# Patient Record
Sex: MALE | Race: BLACK OR AFRICAN AMERICAN | Employment: FULL TIME | URBAN - METROPOLITAN AREA
[De-identification: names, ages, dates, MRNs, and addresses within clinical notes are randomized per-mention and may not be internally consistent; named-entity substitution may affect disease eponyms.]

---

## 2017-06-30 ENCOUNTER — HOSPITAL ENCOUNTER (OUTPATIENT)
Dept: OTHER | Age: 47
Discharge: HOME | End: 2017-06-30
Attending: PHYSICIAN ASSISTANT | Admitting: PHYSICIAN ASSISTANT

## 2017-06-30 DIAGNOSIS — S46.911A STRAIN OF RIGHT SHOULDER, INITIAL ENCOUNTER: ICD-10-CM

## 2017-07-10 ENCOUNTER — HOSPITAL ENCOUNTER (OUTPATIENT)
Dept: MRI IMAGING | Age: 47
Discharge: HOME | End: 2017-07-10
Attending: PHYSICIAN ASSISTANT | Admitting: PHYSICIAN ASSISTANT

## 2017-07-10 DIAGNOSIS — S46.911A STRAIN OF RIGHT SHOULDER, INITIAL ENCOUNTER: ICD-10-CM

## 2017-08-02 ENCOUNTER — HOSPITAL ENCOUNTER (OUTPATIENT)
Dept: OTHER | Age: 47
Discharge: HOME | End: 2017-08-31
Attending: PHYSICIAN ASSISTANT | Admitting: PHYSICIAN ASSISTANT

## 2017-08-04 ENCOUNTER — HOSPITAL ENCOUNTER (OUTPATIENT)
Dept: PHYSICAL THERAPY | Age: 47
Discharge: HOME | End: 2017-08-05
Admitting: PHYSICIAN ASSISTANT

## 2017-08-09 ENCOUNTER — HOSPITAL ENCOUNTER (OUTPATIENT)
Dept: PHYSICAL THERAPY | Age: 47
Discharge: HOME | End: 2017-08-10
Admitting: PHYSICIAN ASSISTANT

## 2017-08-11 ENCOUNTER — HOSPITAL ENCOUNTER (OUTPATIENT)
Dept: PHYSICAL THERAPY | Age: 47
Discharge: HOME | End: 2017-08-12
Admitting: PHYSICIAN ASSISTANT

## 2017-08-14 ENCOUNTER — HOSPITAL ENCOUNTER (OUTPATIENT)
Dept: PHYSICAL THERAPY | Age: 47
Discharge: HOME | End: 2017-08-15
Admitting: PHYSICIAN ASSISTANT

## 2017-08-15 ENCOUNTER — OFFICE VISIT (OUTPATIENT)
Dept: ORTHOPEDIC SURGERY | Age: 47
End: 2017-08-15

## 2017-08-15 VITALS
WEIGHT: 190 LBS | SYSTOLIC BLOOD PRESSURE: 113 MMHG | HEIGHT: 70 IN | HEART RATE: 60 BPM | BODY MASS INDEX: 27.2 KG/M2 | RESPIRATION RATE: 16 BRPM | DIASTOLIC BLOOD PRESSURE: 51 MMHG

## 2017-08-15 DIAGNOSIS — M67.911 TENDINOPATHY OF ROTATOR CUFF, RIGHT: Primary | ICD-10-CM

## 2017-08-15 PROBLEM — M67.919 TENDINOPATHY OF ROTATOR CUFF: Status: ACTIVE | Noted: 2017-08-15

## 2017-08-15 PROCEDURE — 99243 OFF/OP CNSLTJ NEW/EST LOW 30: CPT | Performed by: ORTHOPAEDIC SURGERY

## 2017-08-15 RX ORDER — TRAMADOL HYDROCHLORIDE 50 MG/1
50 TABLET ORAL 2 TIMES DAILY PRN
Qty: 60 TABLET | Refills: 0 | Status: SHIPPED | OUTPATIENT
Start: 2017-08-15 | End: 2017-09-14

## 2017-08-16 ENCOUNTER — HOSPITAL ENCOUNTER (OUTPATIENT)
Dept: PHYSICAL THERAPY | Age: 47
Discharge: HOME | End: 2017-08-17
Admitting: PHYSICIAN ASSISTANT

## 2017-08-18 ENCOUNTER — HOSPITAL ENCOUNTER (OUTPATIENT)
Dept: PHYSICAL THERAPY | Age: 47
Discharge: HOME | End: 2017-08-19
Admitting: PHYSICIAN ASSISTANT

## 2017-08-21 ENCOUNTER — HOSPITAL ENCOUNTER (OUTPATIENT)
Dept: PHYSICAL THERAPY | Age: 47
Discharge: HOME | End: 2017-08-22
Admitting: PHYSICIAN ASSISTANT

## 2017-08-25 ENCOUNTER — TELEPHONE (OUTPATIENT)
Dept: ORTHOPEDIC SURGERY | Age: 47
End: 2017-08-25

## 2017-08-29 ENCOUNTER — TELEPHONE (OUTPATIENT)
Dept: ORTHOPEDIC SURGERY | Age: 47
End: 2017-08-29

## 2017-10-10 ENCOUNTER — TELEPHONE (OUTPATIENT)
Dept: ORTHOPEDIC SURGERY | Age: 47
End: 2017-10-10

## 2017-10-23 ENCOUNTER — TELEPHONE (OUTPATIENT)
Dept: ORTHOPEDIC SURGERY | Age: 47
End: 2017-10-23

## 2017-10-31 ENCOUNTER — OFFICE VISIT (OUTPATIENT)
Dept: ORTHOPEDIC SURGERY | Age: 47
End: 2017-10-31

## 2017-10-31 VITALS
HEART RATE: 68 BPM | BODY MASS INDEX: 26.92 KG/M2 | SYSTOLIC BLOOD PRESSURE: 121 MMHG | HEIGHT: 70 IN | WEIGHT: 188 LBS | DIASTOLIC BLOOD PRESSURE: 76 MMHG

## 2017-10-31 DIAGNOSIS — M67.911 TENDINOPATHY OF RIGHT ROTATOR CUFF: Primary | ICD-10-CM

## 2017-10-31 PROCEDURE — 20610 DRAIN/INJ JOINT/BURSA W/O US: CPT | Performed by: ORTHOPAEDIC SURGERY

## 2017-10-31 NOTE — PROGRESS NOTES
CHIEF COMPLAINT: Right shoulder pain    DATE OF INJURY: 5/6/17    History:    Monse Valle is a 48 y.o. right handed Black male here for right shoulder follow up. Initial history:  referred by Helen Garcia PA-C and Amarilys for evaluation and treatment of Right shoulder pain. This is evaluated as a workers compensation injury. The pain is described as aching, sharp and stabbing. The pain began 3 months ago. There was an injury. He was lifting a 50lb flavor bucket and felt a pop in his shoulder. Pain is rated as a 10/10 . Pain is located lateral.  The symptoms are worse with reaching, lifting, work at or above shoulder height, difficulty sleeping on affected side. Symptoms improve with ice and prednisone. Limited activities include work at or above shoulder height. No stiffness, no weakness reported. The patient does report night pain. The patient has had PT. He has been to a few sessions of PT at [facility] where they have done TENS and ultrasound . The patient has not had an injection. The patient has tried NSAIDs. 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 Interval History:  Shoulder feels the same. Rates pain 9/10. Additional PT was not approved. He has only been to 6 sessions of PT. He has been trying to do HEP. Injection was just approved, though he was seen about 2 months ago. Physical Examination:      Vital signs:  /76   Pulse 68   Ht 5' 10\" (1.778 m)   Wt 188 lb (85.3 kg)   BMI 26.98 kg/m²      General:   alert, appears stated age, cooperative and no distress   Right Shoulder   Active ROM:   forward flexion 90, external rotation 45, internal rotation L4. Left shoulder: forward flexion 180, external rotation 80, internal rotation L4.       Passive ROM:  forward flexion 180    Joint Tenderness:   lateral acromial   Neer:   positive   Cohn:   positive   Strength:   5/5 Supraspinatus, External rotation, Internal rotation    Bilateral

## 2017-11-09 ENCOUNTER — TELEPHONE (OUTPATIENT)
Dept: ORTHOPEDIC SURGERY | Age: 47
End: 2017-11-09

## 2018-04-03 ENCOUNTER — OFFICE VISIT (OUTPATIENT)
Dept: ORTHOPEDIC SURGERY | Age: 48
End: 2018-04-03

## 2018-04-03 VITALS
HEIGHT: 70 IN | BODY MASS INDEX: 26.48 KG/M2 | DIASTOLIC BLOOD PRESSURE: 74 MMHG | HEART RATE: 74 BPM | RESPIRATION RATE: 16 BRPM | WEIGHT: 185 LBS | SYSTOLIC BLOOD PRESSURE: 119 MMHG

## 2018-04-03 DIAGNOSIS — M67.911 TENDINOPATHY OF RIGHT ROTATOR CUFF: Primary | ICD-10-CM

## 2018-04-03 DIAGNOSIS — M19.019 AC (ACROMIOCLAVICULAR) ARTHRITIS: ICD-10-CM

## 2018-04-03 PROCEDURE — 99214 OFFICE O/P EST MOD 30 MIN: CPT | Performed by: ORTHOPAEDIC SURGERY

## 2018-04-03 RX ORDER — CYCLOBENZAPRINE HCL 5 MG
10 TABLET ORAL 3 TIMES DAILY PRN
COMMUNITY
End: 2018-11-15

## 2018-04-03 RX ORDER — TRAMADOL HYDROCHLORIDE 50 MG/1
50 TABLET ORAL EVERY 6 HOURS PRN
COMMUNITY
End: 2018-06-01 | Stop reason: HOSPADM

## 2018-04-11 ENCOUNTER — TELEPHONE (OUTPATIENT)
Dept: ORTHOPEDIC SURGERY | Age: 48
End: 2018-04-11

## 2018-04-16 ENCOUNTER — TELEPHONE (OUTPATIENT)
Dept: ORTHOPEDIC SURGERY | Age: 48
End: 2018-04-16

## 2018-05-29 ENCOUNTER — PAT TELEPHONE (OUTPATIENT)
Dept: PREADMISSION TESTING | Age: 48
End: 2018-05-29

## 2018-05-29 VITALS — WEIGHT: 185 LBS | HEIGHT: 70 IN | BODY MASS INDEX: 26.48 KG/M2

## 2018-05-29 RX ORDER — SILDENAFIL CITRATE 20 MG/1
20 TABLET ORAL 3 TIMES DAILY
COMMUNITY
End: 2018-11-15

## 2018-06-01 ENCOUNTER — HOSPITAL ENCOUNTER (OUTPATIENT)
Dept: SURGERY | Age: 48
Discharge: HOME | End: 2018-06-01
Attending: ORTHOPAEDIC SURGERY | Admitting: ORTHOPAEDIC SURGERY

## 2018-06-01 VITALS
RESPIRATION RATE: 16 BRPM | WEIGHT: 190 LBS | OXYGEN SATURATION: 98 % | BODY MASS INDEX: 27.26 KG/M2 | DIASTOLIC BLOOD PRESSURE: 77 MMHG | TEMPERATURE: 97.8 F | SYSTOLIC BLOOD PRESSURE: 118 MMHG | HEART RATE: 57 BPM

## 2018-06-01 DIAGNOSIS — M67.911 TENDINOPATHY OF RIGHT ROTATOR CUFF: Primary | ICD-10-CM

## 2018-06-01 LAB
GLUCOSE BLD-MCNC: 118 MG/DL (ref 70–99)
GLUCOSE BLD-MCNC: 133 MG/DL (ref 70–99)
PERFORMED ON: ABNORMAL
PERFORMED ON: ABNORMAL

## 2018-06-01 PROCEDURE — 29828 SHO ARTHRS SRG BICP TENODSIS: CPT | Performed by: ORTHOPAEDIC SURGERY

## 2018-06-01 PROCEDURE — 29826 SHO ARTHRS SRG DECOMPRESSION: CPT | Performed by: ORTHOPAEDIC SURGERY

## 2018-06-01 RX ORDER — OXYCODONE HYDROCHLORIDE AND ACETAMINOPHEN 5; 325 MG/1; MG/1
1 TABLET ORAL EVERY 4 HOURS PRN
Qty: 60 TABLET | Refills: 0 | Status: SHIPPED | OUTPATIENT
Start: 2018-06-01 | End: 2018-06-14 | Stop reason: SDUPTHER

## 2018-06-01 RX ORDER — SODIUM CHLORIDE 0.9 % (FLUSH) 0.9 %
10 SYRINGE (ML) INJECTION PRN
Status: DISCONTINUED | OUTPATIENT
Start: 2018-06-01 | End: 2018-06-02 | Stop reason: HOSPADM

## 2018-06-01 RX ORDER — ONDANSETRON 2 MG/ML
4 INJECTION INTRAMUSCULAR; INTRAVENOUS
Status: ACTIVE | OUTPATIENT
Start: 2018-06-01 | End: 2018-06-01

## 2018-06-01 RX ORDER — FENTANYL CITRATE 50 UG/ML
25 INJECTION, SOLUTION INTRAMUSCULAR; INTRAVENOUS EVERY 5 MIN PRN
Status: DISCONTINUED | OUTPATIENT
Start: 2018-06-01 | End: 2018-06-02 | Stop reason: HOSPADM

## 2018-06-01 RX ORDER — OXYCODONE HYDROCHLORIDE AND ACETAMINOPHEN 5; 325 MG/1; MG/1
2 TABLET ORAL PRN
Status: ACTIVE | OUTPATIENT
Start: 2018-06-01 | End: 2018-06-01

## 2018-06-01 RX ORDER — PROMETHAZINE HYDROCHLORIDE 25 MG/1
25 TABLET ORAL EVERY 6 HOURS PRN
Qty: 5 TABLET | Refills: 0 | Status: SHIPPED | OUTPATIENT
Start: 2018-06-01 | End: 2018-06-25 | Stop reason: ALTCHOICE

## 2018-06-01 RX ORDER — SODIUM CHLORIDE 0.9 % (FLUSH) 0.9 %
10 SYRINGE (ML) INJECTION EVERY 12 HOURS SCHEDULED
Status: DISCONTINUED | OUTPATIENT
Start: 2018-06-01 | End: 2018-06-02 | Stop reason: HOSPADM

## 2018-06-01 RX ORDER — OXYCODONE HYDROCHLORIDE AND ACETAMINOPHEN 5; 325 MG/1; MG/1
1 TABLET ORAL PRN
Status: ACTIVE | OUTPATIENT
Start: 2018-06-01 | End: 2018-06-01

## 2018-06-01 ASSESSMENT — PAIN DESCRIPTION - DESCRIPTORS: DESCRIPTORS: THROBBING;STABBING;SHARP

## 2018-06-01 ASSESSMENT — PAIN SCALES - GENERAL
PAINLEVEL_OUTOF10: 0
PAINLEVEL_OUTOF10: 0

## 2018-06-01 ASSESSMENT — PAIN - FUNCTIONAL ASSESSMENT: PAIN_FUNCTIONAL_ASSESSMENT: 0-10

## 2018-06-01 ASSESSMENT — LIFESTYLE VARIABLES: SMOKING_STATUS: 0

## 2018-06-01 ASSESSMENT — ENCOUNTER SYMPTOMS: SHORTNESS OF BREATH: 0

## 2018-06-04 ENCOUNTER — OFFICE VISIT (OUTPATIENT)
Dept: ORTHOPEDIC SURGERY | Age: 48
End: 2018-06-04

## 2018-06-04 VITALS — WEIGHT: 190.04 LBS | RESPIRATION RATE: 17 BRPM | BODY MASS INDEX: 27.21 KG/M2 | HEIGHT: 70 IN | HEART RATE: 72 BPM

## 2018-06-04 DIAGNOSIS — S43.431D SUPERIOR GLENOID LABRUM LESION OF RIGHT SHOULDER, SUBSEQUENT ENCOUNTER: ICD-10-CM

## 2018-06-04 DIAGNOSIS — M67.911 TENDINOPATHY OF RIGHT ROTATOR CUFF: Primary | ICD-10-CM

## 2018-06-04 PROCEDURE — 99024 POSTOP FOLLOW-UP VISIT: CPT | Performed by: ORTHOPAEDIC SURGERY

## 2018-06-08 ENCOUNTER — TELEPHONE (OUTPATIENT)
Dept: ORTHOPEDIC SURGERY | Age: 48
End: 2018-06-08

## 2018-06-14 ENCOUNTER — TELEPHONE (OUTPATIENT)
Dept: ORTHOPEDIC SURGERY | Age: 48
End: 2018-06-14

## 2018-06-14 DIAGNOSIS — S43.431D SUPERIOR GLENOID LABRUM LESION OF RIGHT SHOULDER, SUBSEQUENT ENCOUNTER: ICD-10-CM

## 2018-06-14 DIAGNOSIS — M67.911 TENDINOPATHY OF RIGHT ROTATOR CUFF: Primary | ICD-10-CM

## 2018-06-14 RX ORDER — CYCLOBENZAPRINE HCL 5 MG
5 TABLET ORAL 3 TIMES DAILY PRN
Qty: 30 TABLET | Refills: 0 | Status: SHIPPED | OUTPATIENT
Start: 2018-06-14 | End: 2018-06-24

## 2018-06-14 RX ORDER — OXYCODONE HYDROCHLORIDE AND ACETAMINOPHEN 5; 325 MG/1; MG/1
1 TABLET ORAL EVERY 8 HOURS PRN
Qty: 21 TABLET | Refills: 0 | Status: SHIPPED | OUTPATIENT
Start: 2018-06-14 | End: 2018-06-21

## 2018-06-19 ENCOUNTER — OFFICE VISIT (OUTPATIENT)
Dept: ORTHOPEDIC SURGERY | Age: 48
End: 2018-06-19

## 2018-06-19 VITALS — BODY MASS INDEX: 27.2 KG/M2 | RESPIRATION RATE: 16 BRPM | WEIGHT: 190 LBS | HEIGHT: 70 IN

## 2018-06-19 DIAGNOSIS — S43.431D SUPERIOR GLENOID LABRUM LESION OF RIGHT SHOULDER, SUBSEQUENT ENCOUNTER: ICD-10-CM

## 2018-06-19 DIAGNOSIS — M67.911 TENDINOPATHY OF RIGHT ROTATOR CUFF: Primary | ICD-10-CM

## 2018-06-19 PROBLEM — M19.019 AC (ACROMIOCLAVICULAR) ARTHRITIS: Status: RESOLVED | Noted: 2018-04-03 | Resolved: 2018-06-19

## 2018-06-19 PROCEDURE — 99024 POSTOP FOLLOW-UP VISIT: CPT | Performed by: ORTHOPAEDIC SURGERY

## 2018-06-25 ENCOUNTER — TELEPHONE (OUTPATIENT)
Dept: ORTHOPEDIC SURGERY | Age: 48
End: 2018-06-25

## 2018-06-25 DIAGNOSIS — S43.431D SUPERIOR GLENOID LABRUM LESION OF RIGHT SHOULDER, SUBSEQUENT ENCOUNTER: Primary | ICD-10-CM

## 2018-06-25 RX ORDER — OXYCODONE HYDROCHLORIDE AND ACETAMINOPHEN 5; 325 MG/1; MG/1
1 TABLET ORAL EVERY 8 HOURS PRN
Qty: 21 TABLET | Refills: 0 | Status: SHIPPED | OUTPATIENT
Start: 2018-06-25 | End: 2018-07-02

## 2018-06-26 ENCOUNTER — HOSPITAL ENCOUNTER (OUTPATIENT)
Dept: OTHER | Age: 48
Discharge: HOME | End: 2018-06-30
Attending: ORTHOPAEDIC SURGERY | Admitting: ORTHOPAEDIC SURGERY

## 2018-06-26 NOTE — FLOWSHEET NOTE
Physical Therapy Daily Treatment Note  Date:  2018    Patient Name:  Rose Mendenhall    :  1970  MRN: [id number]    Restrictions/Precautions: Position Activity Restriction  Other position/activity restrictions: no fall risk     Pertinent Medical History: Additional Pertinent Hx: DM, HTN    Medical/Treatment Diagnosis Information:  · Diagnosis: R shoulder sx with scope for SLAP tear and RTC tear; biceps tenodesis   · Treatment Diagnosis: R shoulder pain, dec ROM, dec strength     Insurance/Certification information:  PT Insurance Information: WC x 18 visits   Physician Information:  Referring Practitioner: Dr. Rodriguez Meth of care signed (Y/N):  Sent to inbox    Visit# / total visits:   - G code at 10   Pain level: 9/10     G-Code (if applicable):      Date / Visit # G-Code Applied:  /  PT G-Codes  Functional Assessment Tool Used: UEFS  Score: 3.75/80-99%  Functional Limitation: Carrying, moving and handling objects  Carrying, Moving and Handling Objects Current Status (): At least 80 percent but less than 100 percent impaired, limited or restricted  Carrying, Moving and Handling Objects Goal Status (): At least 60 percent but less than 80 percent impaired, limited or restricted    Progress Note: []  Yes  [x]  No  Next due by: Visit #10      History of Injury: Subjective  Pt with R shoulder sx on 18 for SLAP tear and RTC tear debridement  with SAD and biceps tenodesis. Pt wearing sling daily, icing daily, hand exercises. Next f/u . Pain up to 9/10 in R shoulder with intermittent hand NT . Subjective:   Pt had injury and PT during summer of 2017 and then [facility] complications led him to be without therapy from 2017- until surgery and now. Pt reports MD was very upset with WC and told him due to tightness/frozen shoulder he will be in PT for a long time.      Objective:  Observation:   Test measurements:      Exercises:per protocol; no resisted elbow flex/supination x 6 wks

## 2018-06-26 NOTE — PROGRESS NOTES
Physical Therapy  Initial Assessment  Date: 2018  Patient Name: Garrison Mcneill  MRN: [id number]  : 1970     Treatment Diagnosis: R shoulder pain, dec ROM, dec strength     Restrictions  Position Activity Restriction  Other position/activity restrictions: no fall risk     Subjective   General  Chart Reviewed: Yes  Patient assessed for rehabilitation services?: Yes  Additional Pertinent Hx: DM, HTN  Family / Caregiver Present: No  Referring Practitioner: Dr. Savi Sue  Referral Date : 18  Diagnosis: R shoulder sx with scope for SLAP tear and RTC tear; biceps tenodesis   General Comment  Comments: Pt with R shoulder sx on 18 for SLAP tear and RTC tear, with SAD and biceps tenodesis. Pt wearing sling daily, icing daily, hand exercises. Next f/u . Pain up to 9/10 in R shoulder with intermittent hand NT . PT Visit Information  PT Insurance Information: WC x 18 visits   Total # of Visits Approved: 18  Total # of Visits to Date: 1  Progress Note Counter: 1/10  Subjective  Subjective: 18          Social/Functional History  Social/Functional History  Type of occupation: need to lift 40-50# consistent with work   IADL Comments: needs A with all ADLs now   Objective     Observation/Palpation  Palpation: very TTP all over R shoulder   Observation: incision portals healed up well     PROM RUE (degrees)  R Shoulder Flex  0-180: 45  R Shoulder ABduction 0-180: 30  R Shoulder Int Rotation  0-70: to belly - arm by side   R Shoulder Ext Rotation  0-90: 20 arm by side    Strength RUE  Comment: NT yet due to recent sx, in sling, painful      Assessment   Conditions Requiring Skilled Therapeutic Intervention  Body structures, Functions, Activity limitations: Decreased functional mobility ; Decreased ADL status; Decreased ROM; Decreased strength;Decreased endurance;Decreased high-level IADLs  Assessment: prior level of function: pt able to perform all self care, reaching, lifting independently without c/o; DX:

## 2018-06-26 NOTE — PLAN OF CARE
Outpatient Physical Therapy  [x] [facility]    Phone: [phone]   Fax: [phone]   [] [facility]  Phone: [phone]              Fax: [phone]  [] [facility]   Phone: [phone]   Fax: [phone]     To: Referring Practitioner: Dr. Meryl Da Silva      Patient: Adilia Dale   : 1970   MRN: [id number]  Evaluation Date: 2018      Diagnosis Information:  · Diagnosis: R shoulder sx with scope for SLAP tear and RTC tear; biceps tenodesis    · Treatment Diagnosis: R shoulder pain, dec ROM, dec strength      Physical Therapy Certification/Re-Certification Form  Dear Dr. Meryl Da Silva,   The following patient has been evaluated for physical therapy services and for therapy to continue, Medicare requires monthly physician review of the treatment plan. Please review the attached evaluation and/or summary of the patient's plan of care, and verify that you agree therapy should continue by signing the attached document and sending it back to our office. Plan of Care/Treatment to date:per protocol   [x] Therapeutic Exercise    [x] Modalities:  [x] Therapeutic Activity     [x] Ultrasound  [x] Electrical Stimulation  [] Gait Training      [] Cervical Traction [] Lumbar Traction  [x] Neuromuscular Re-education    [x] Cold/hotpack [] Iontophoresis   [x] Instruction in HEP     Other:  [x] Manual Therapy      []             [] Aquatic Therapy      []           ? Frequency/Duration:  # Days per week: [] 1 day # Weeks: [] 1 week [] 5 weeks     [x] 2 days? [] 2 weeks [x] 6 weeks     [x] 3 days   [] 3 weeks [] 7 weeks     [] 4 days   [] 4 weeks [x] 8 weeks    Rehab Potential: [] Excellent [x] Good [] Fair  [] Poor       Electronically signed by:  Criselda Agrawal [address]    If you have any questions or concerns, please don't hesitate to call.   Thank you for your referral.      Physician Signature:________________________________Date:__________________  By signing above, therapists plan is approved by physician

## 2018-06-28 ENCOUNTER — HOSPITAL ENCOUNTER (OUTPATIENT)
Dept: PHYSICAL THERAPY | Age: 48
Discharge: HOME | End: 2018-06-29
Admitting: ORTHOPAEDIC SURGERY

## 2018-07-01 ENCOUNTER — HOSPITAL ENCOUNTER (OUTPATIENT)
Dept: OTHER | Age: 48
Discharge: HOME | End: 2018-07-19
Attending: ORTHOPAEDIC SURGERY | Admitting: ORTHOPAEDIC SURGERY

## 2018-07-05 ENCOUNTER — TELEPHONE (OUTPATIENT)
Dept: ORTHOPEDIC SURGERY | Age: 48
End: 2018-07-05

## 2018-07-05 NOTE — TELEPHONE ENCOUNTER
After consulting with Dr Yola Roblero, patient was added on to see Dr Yola Roblero Monday 7/9/18. Explained that Dr Yola Roblero is out of town until then. Dr Yola Roblero stated it was fine to follow up Monday.

## 2018-07-06 ENCOUNTER — TELEPHONE (OUTPATIENT)
Dept: ORTHOPEDIC SURGERY | Age: 48
End: 2018-07-06

## 2018-07-06 NOTE — TELEPHONE ENCOUNTER
Called patient. Explained that Dr Yola Roblero is out of town today. Informed him that this could possibly be Monday when he comes in to see him in office. He said he has more pain so he took more meds and now doesn't have any. Asked if he could take Ibuprofen. I told him that he could not take NSAIDS, but he may take Tylenol,rest and ice. Dr Yola Roblero, patient is requesting Percocet refill. Last fill 6/25/18.   Thank you

## 2018-07-09 ENCOUNTER — TELEPHONE (OUTPATIENT)
Dept: ORTHOPEDIC SURGERY | Age: 48
End: 2018-07-09

## 2018-07-09 ENCOUNTER — OFFICE VISIT (OUTPATIENT)
Dept: ORTHOPEDIC SURGERY | Age: 48
End: 2018-07-09

## 2018-07-09 VITALS — WEIGHT: 190 LBS | BODY MASS INDEX: 27.2 KG/M2 | RESPIRATION RATE: 16 BRPM | HEIGHT: 70 IN

## 2018-07-09 DIAGNOSIS — S43.431D SUPERIOR GLENOID LABRUM LESION OF RIGHT SHOULDER, SUBSEQUENT ENCOUNTER: ICD-10-CM

## 2018-07-09 DIAGNOSIS — M67.911 TENDINOPATHY OF RIGHT ROTATOR CUFF: Primary | ICD-10-CM

## 2018-07-09 PROCEDURE — 99024 POSTOP FOLLOW-UP VISIT: CPT | Performed by: ORTHOPAEDIC SURGERY

## 2018-07-09 RX ORDER — OXYCODONE HYDROCHLORIDE AND ACETAMINOPHEN 5; 325 MG/1; MG/1
1 TABLET ORAL EVERY 8 HOURS PRN
Qty: 21 TABLET | Refills: 0 | Status: SHIPPED | OUTPATIENT
Start: 2018-07-09 | End: 2018-07-16

## 2018-07-09 RX ORDER — TRAMADOL HYDROCHLORIDE 50 MG/1
50 TABLET ORAL EVERY 6 HOURS PRN
COMMUNITY
End: 2018-11-15

## 2018-07-16 ENCOUNTER — TELEPHONE (OUTPATIENT)
Dept: ORTHOPEDIC SURGERY | Age: 48
End: 2018-07-16

## 2018-07-17 NOTE — TELEPHONE ENCOUNTER
Luis Pendleton, Please see Dr Marco Antonio Villareal previous message. Let me know if you need something else.

## 2018-07-23 ENCOUNTER — TELEPHONE (OUTPATIENT)
Dept: ORTHOPEDIC SURGERY | Age: 48
End: 2018-07-23

## 2018-07-31 ENCOUNTER — OFFICE VISIT (OUTPATIENT)
Dept: ORTHOPEDIC SURGERY | Age: 48
End: 2018-07-31

## 2018-07-31 VITALS — RESPIRATION RATE: 16 BRPM | BODY MASS INDEX: 28.63 KG/M2 | HEIGHT: 70 IN | WEIGHT: 200 LBS

## 2018-07-31 DIAGNOSIS — S43.431D SUPERIOR GLENOID LABRUM LESION OF RIGHT SHOULDER, SUBSEQUENT ENCOUNTER: Primary | ICD-10-CM

## 2018-07-31 PROCEDURE — 99024 POSTOP FOLLOW-UP VISIT: CPT | Performed by: ORTHOPAEDIC SURGERY

## 2018-07-31 NOTE — PROGRESS NOTES
Shoulder Arthroscopy Follow-up  Brian Tenorio is here for follow up after right shoulder arthroscopic surgery. Surgery date was 6/1/18. Findings at surgery: type 2 SLAP tear, partial biceps tendon tear, partial supraspinatus tear. Pain is controlled with current analgesics. Medication(s) being used: Percocet. The patient's pain is rated at 9/10. Pain is located lateral acromial.  Awaiting [facility] approval for revision biceps tenodesis. They are making him see the same JACOB he saw before, which is not until the end of August.  That was the same physician who just told him to have surgery. Physical Examination:  Resp 16   Ht 5' 10\" (1.778 m)   Wt 200 lb (90.7 kg)   BMI 28.70 kg/m²       Patient is awake, alert, and in no acute distress. Sensation is intact to light touch in the axillary, median, radial, and ulnar nerve distribution. The EPL, FPL, and interossei are grossly intact. The right upper extremity is warm and well-perfused with brisk capillary refill.    +mild Andrea deformity  Tender at proximal biceps, bicipital groove and lateral acromion      Assessment:   2 months status post right shoulder arthroscopy, subacromial decompression, rotator cuff debridement, and biceps tenodesis      Plan:   I discussed with patient that all his pain is not from failure of the intraarticular biceps tenodesis. I have done many biceps tenotomies and patient's do not have severe pain afterwards. I think much of this is related to his debility, which we had discussed would likely happen postop and that he would have a difficult or longer postop rehab. He needs to continue to work on shoulder ROM and rotator cuff strengthening. NSAIDs prn until 1 week prior to surgery. He is requesting refill of pain medication. He was just escribed a prescription 3 days ago. He needs to wean off at this time. A prescription monitoring report was reviewed for the patient via [employer].     Plan for right shoulder diagnostic arthroscopy, revision open subpectoral biceps tenodesis. Awaiting [facility] approval. I have again discussed with him my concern for musculocutaneous nerve injury with open subpectoral biceps tenodesis since it sounds like the earliest we would be able to do his surgery would be September after he sees the JACOB. He still wishes to proceed.

## 2018-08-03 ENCOUNTER — TELEPHONE (OUTPATIENT)
Dept: ORTHOPEDIC SURGERY | Age: 48
End: 2018-08-03

## 2018-08-03 NOTE — TELEPHONE ENCOUNTER
Called Krystin with [employer]. I explained that we faxed that C9 7/9/18 and havent heard anything. She asked that I fax again and gave me a new fax number [phone]. She stated that it would go faster if I emailed C9 to her to [email or web address]. C9 was emailed to speed up process.

## 2018-09-07 ENCOUNTER — TELEPHONE (OUTPATIENT)
Dept: ORTHOPEDIC SURGERY | Age: 48
End: 2018-09-07

## 2018-11-05 ENCOUNTER — TELEPHONE (OUTPATIENT)
Dept: ORTHOPEDIC SURGERY | Age: 48
End: 2018-11-05

## 2018-11-09 ENCOUNTER — TELEPHONE (OUTPATIENT)
Dept: ORTHOPEDIC SURGERY | Age: 48
End: 2018-11-09

## 2018-11-09 NOTE — TELEPHONE ENCOUNTER
Pt calling back stating his last medco was not updated because it says his return date is on 11/1/18   This needs to be changed to return back to work in February 2019 since he is scheduled for sx on 11/16/18  Pt has not been paid in two weeks   pls call pt   Pt will need updated medco to be faxed to his atty Sivan Villa fax [phone] and also to Charlee Ramos

## 2018-11-12 ENCOUNTER — ANESTHESIA EVENT (OUTPATIENT)
Dept: OPERATING ROOM | Age: 48
End: 2018-11-12
Payer: COMMERCIAL

## 2018-11-15 RX ORDER — CYCLOBENZAPRINE HCL 10 MG
10 TABLET ORAL
COMMUNITY
Start: 2017-06-28 | End: 2018-11-15

## 2018-11-15 RX ORDER — BETAMETHASONE DIPROPIONATE 0.05 %
OINTMENT (GRAM) TOPICAL
COMMUNITY
Start: 2018-07-09 | End: 2018-11-15

## 2018-11-15 RX ORDER — LISINOPRIL 2.5 MG/1
2.5 TABLET ORAL
COMMUNITY
Start: 2018-07-20

## 2018-11-15 RX ORDER — OMEPRAZOLE 40 MG/1
40 CAPSULE, DELAYED RELEASE ORAL
COMMUNITY
End: 2018-11-15

## 2018-11-15 RX ORDER — ATORVASTATIN CALCIUM 20 MG/1
20 TABLET, FILM COATED ORAL
COMMUNITY
Start: 2018-07-20

## 2018-11-15 RX ORDER — FENOFIBRATE 145 MG/1
145 TABLET, COATED ORAL DAILY
COMMUNITY
Start: 2018-07-23

## 2018-11-15 RX ORDER — SILDENAFIL CITRATE 20 MG/1
20 TABLET ORAL
COMMUNITY
Start: 2018-07-23

## 2018-11-16 ENCOUNTER — HOSPITAL ENCOUNTER (OUTPATIENT)
Age: 48
Setting detail: OUTPATIENT SURGERY
Discharge: HOME | End: 2018-11-16
Attending: ORTHOPAEDIC SURGERY | Admitting: ORTHOPAEDIC SURGERY
Payer: COMMERCIAL

## 2018-11-16 ENCOUNTER — ANESTHESIA (OUTPATIENT)
Dept: OPERATING ROOM | Age: 48
End: 2018-11-16
Payer: COMMERCIAL

## 2018-11-16 VITALS
HEART RATE: 75 BPM | SYSTOLIC BLOOD PRESSURE: 121 MMHG | RESPIRATION RATE: 16 BRPM | BODY MASS INDEX: 29.45 KG/M2 | OXYGEN SATURATION: 95 % | DIASTOLIC BLOOD PRESSURE: 82 MMHG | HEIGHT: 70 IN | WEIGHT: 205.69 LBS | TEMPERATURE: 97.9 F

## 2018-11-16 VITALS
DIASTOLIC BLOOD PRESSURE: 51 MMHG | OXYGEN SATURATION: 99 % | RESPIRATION RATE: 1 BRPM | TEMPERATURE: 96.3 F | SYSTOLIC BLOOD PRESSURE: 81 MMHG

## 2018-11-16 DIAGNOSIS — S43.431A SUPERIOR GLENOID LABRUM LESION OF RIGHT SHOULDER, INITIAL ENCOUNTER: Primary | ICD-10-CM

## 2018-11-16 LAB
GLUCOSE BLD-MCNC: 123 MG/DL (ref 70–99)
GLUCOSE BLD-MCNC: 149 MG/DL (ref 70–99)
PERFORMED ON: ABNORMAL
PERFORMED ON: ABNORMAL

## 2018-11-16 PROCEDURE — 6360000002 HC RX W HCPCS: Performed by: ANESTHESIOLOGY

## 2018-11-16 PROCEDURE — 2720000010 HC SURG SUPPLY STERILE: Performed by: ORTHOPAEDIC SURGERY

## 2018-11-16 PROCEDURE — 23430 REPAIR BICEPS TENDON: CPT | Performed by: ORTHOPAEDIC SURGERY

## 2018-11-16 PROCEDURE — 6360000002 HC RX W HCPCS: Performed by: ORTHOPAEDIC SURGERY

## 2018-11-16 PROCEDURE — 2500000003 HC RX 250 WO HCPCS: Performed by: NURSE ANESTHETIST, CERTIFIED REGISTERED

## 2018-11-16 PROCEDURE — 7100000000 HC PACU RECOVERY - FIRST 15 MIN: Performed by: ORTHOPAEDIC SURGERY

## 2018-11-16 PROCEDURE — 2580000003 HC RX 258: Performed by: ANESTHESIOLOGY

## 2018-11-16 PROCEDURE — 2709999900 HC NON-CHARGEABLE SUPPLY: Performed by: ORTHOPAEDIC SURGERY

## 2018-11-16 PROCEDURE — C1713 ANCHOR/SCREW BN/BN,TIS/BN: HCPCS | Performed by: ORTHOPAEDIC SURGERY

## 2018-11-16 PROCEDURE — 6360000002 HC RX W HCPCS: Performed by: NURSE ANESTHETIST, CERTIFIED REGISTERED

## 2018-11-16 PROCEDURE — 6360000002 HC RX W HCPCS

## 2018-11-16 PROCEDURE — 29805 SHO ARTHRS DX +- SYNOVIAL BX: CPT | Performed by: ORTHOPAEDIC SURGERY

## 2018-11-16 PROCEDURE — 2580000003 HC RX 258: Performed by: ORTHOPAEDIC SURGERY

## 2018-11-16 PROCEDURE — 7100000010 HC PHASE II RECOVERY - FIRST 15 MIN: Performed by: ORTHOPAEDIC SURGERY

## 2018-11-16 PROCEDURE — 3700000001 HC ADD 15 MINUTES (ANESTHESIA): Performed by: ORTHOPAEDIC SURGERY

## 2018-11-16 PROCEDURE — 3700000000 HC ANESTHESIA ATTENDED CARE: Performed by: ORTHOPAEDIC SURGERY

## 2018-11-16 PROCEDURE — 7100000011 HC PHASE II RECOVERY - ADDTL 15 MIN: Performed by: ORTHOPAEDIC SURGERY

## 2018-11-16 PROCEDURE — 6370000000 HC RX 637 (ALT 250 FOR IP): Performed by: ANESTHESIOLOGY

## 2018-11-16 PROCEDURE — 7100000001 HC PACU RECOVERY - ADDTL 15 MIN: Performed by: ORTHOPAEDIC SURGERY

## 2018-11-16 PROCEDURE — 3600000014 HC SURGERY LEVEL 4 ADDTL 15MIN: Performed by: ORTHOPAEDIC SURGERY

## 2018-11-16 PROCEDURE — 3600000004 HC SURGERY LEVEL 4 BASE: Performed by: ORTHOPAEDIC SURGERY

## 2018-11-16 PROCEDURE — 2580000003 HC RX 258: Performed by: NURSE ANESTHETIST, CERTIFIED REGISTERED

## 2018-11-16 DEVICE — SCREW INTRF BIOCOMP 7X10 MM FACILITATE BIOTENODESIS: Type: IMPLANTABLE DEVICE | Site: SHOULDER | Status: FUNCTIONAL

## 2018-11-16 RX ORDER — POVIDONE-IODINE 10 MG/G
OINTMENT TOPICAL
Status: COMPLETED | OUTPATIENT
Start: 2018-11-16 | End: 2018-11-16

## 2018-11-16 RX ORDER — LIDOCAINE HYDROCHLORIDE 20 MG/ML
INJECTION, SOLUTION INFILTRATION; PERINEURAL PRN
Status: DISCONTINUED | OUTPATIENT
Start: 2018-11-16 | End: 2018-11-16 | Stop reason: SDUPTHER

## 2018-11-16 RX ORDER — OXYCODONE HYDROCHLORIDE AND ACETAMINOPHEN 5; 325 MG/1; MG/1
2 TABLET ORAL PRN
Status: COMPLETED | OUTPATIENT
Start: 2018-11-16 | End: 2018-11-16

## 2018-11-16 RX ORDER — GLYCOPYRROLATE 0.2 MG/ML
INJECTION INTRAMUSCULAR; INTRAVENOUS PRN
Status: DISCONTINUED | OUTPATIENT
Start: 2018-11-16 | End: 2018-11-16 | Stop reason: SDUPTHER

## 2018-11-16 RX ORDER — MIDAZOLAM HYDROCHLORIDE 1 MG/ML
INJECTION INTRAMUSCULAR; INTRAVENOUS PRN
Status: DISCONTINUED | OUTPATIENT
Start: 2018-11-16 | End: 2018-11-16 | Stop reason: SDUPTHER

## 2018-11-16 RX ORDER — FENTANYL CITRATE 50 UG/ML
INJECTION, SOLUTION INTRAMUSCULAR; INTRAVENOUS PRN
Status: DISCONTINUED | OUTPATIENT
Start: 2018-11-16 | End: 2018-11-16 | Stop reason: SDUPTHER

## 2018-11-16 RX ORDER — OXYCODONE HYDROCHLORIDE AND ACETAMINOPHEN 5; 325 MG/1; MG/1
1 TABLET ORAL PRN
Status: COMPLETED | OUTPATIENT
Start: 2018-11-16 | End: 2018-11-16

## 2018-11-16 RX ORDER — OXYCODONE HYDROCHLORIDE AND ACETAMINOPHEN 5; 325 MG/1; MG/1
1 TABLET ORAL EVERY 4 HOURS PRN
Qty: 60 TABLET | Refills: 0 | Status: SHIPPED | OUTPATIENT
Start: 2018-11-16 | End: 2018-11-30

## 2018-11-16 RX ORDER — ONDANSETRON 2 MG/ML
4 INJECTION INTRAMUSCULAR; INTRAVENOUS
Status: DISCONTINUED | OUTPATIENT
Start: 2018-11-16 | End: 2018-11-16 | Stop reason: HOSPADM

## 2018-11-16 RX ORDER — MAGNESIUM HYDROXIDE 1200 MG/15ML
LIQUID ORAL CONTINUOUS PRN
Status: DISCONTINUED | OUTPATIENT
Start: 2018-11-16 | End: 2018-11-16 | Stop reason: HOSPADM

## 2018-11-16 RX ORDER — DEXAMETHASONE SODIUM PHOSPHATE 4 MG/ML
INJECTION, SOLUTION INTRA-ARTICULAR; INTRALESIONAL; INTRAMUSCULAR; INTRAVENOUS; SOFT TISSUE PRN
Status: DISCONTINUED | OUTPATIENT
Start: 2018-11-16 | End: 2018-11-16 | Stop reason: SDUPTHER

## 2018-11-16 RX ORDER — PROMETHAZINE HYDROCHLORIDE 25 MG/1
25 TABLET ORAL EVERY 6 HOURS PRN
Qty: 5 TABLET | Refills: 0 | Status: SHIPPED | OUTPATIENT
Start: 2018-11-16 | End: 2018-12-04

## 2018-11-16 RX ORDER — SODIUM CHLORIDE 0.9 % (FLUSH) 0.9 %
10 SYRINGE (ML) INJECTION EVERY 12 HOURS SCHEDULED
Status: DISCONTINUED | OUTPATIENT
Start: 2018-11-16 | End: 2018-11-16 | Stop reason: HOSPADM

## 2018-11-16 RX ORDER — PROPOFOL 10 MG/ML
INJECTION, EMULSION INTRAVENOUS PRN
Status: DISCONTINUED | OUTPATIENT
Start: 2018-11-16 | End: 2018-11-16 | Stop reason: SDUPTHER

## 2018-11-16 RX ORDER — HYDROMORPHONE HCL 110MG/55ML
PATIENT CONTROLLED ANALGESIA SYRINGE INTRAVENOUS PRN
Status: DISCONTINUED | OUTPATIENT
Start: 2018-11-16 | End: 2018-11-16 | Stop reason: SDUPTHER

## 2018-11-16 RX ORDER — SODIUM CHLORIDE 9 MG/ML
INJECTION, SOLUTION INTRAVENOUS CONTINUOUS
Status: DISCONTINUED | OUTPATIENT
Start: 2018-11-16 | End: 2018-11-16 | Stop reason: HOSPADM

## 2018-11-16 RX ORDER — SUCCINYLCHOLINE CHLORIDE 20 MG/ML
INJECTION INTRAMUSCULAR; INTRAVENOUS PRN
Status: DISCONTINUED | OUTPATIENT
Start: 2018-11-16 | End: 2018-11-16 | Stop reason: SDUPTHER

## 2018-11-16 RX ORDER — FENTANYL CITRATE 50 UG/ML
25 INJECTION, SOLUTION INTRAMUSCULAR; INTRAVENOUS EVERY 5 MIN PRN
Status: DISCONTINUED | OUTPATIENT
Start: 2018-11-16 | End: 2018-11-16 | Stop reason: HOSPADM

## 2018-11-16 RX ORDER — SODIUM CHLORIDE 9 MG/ML
INJECTION, SOLUTION INTRAVENOUS CONTINUOUS PRN
Status: DISCONTINUED | OUTPATIENT
Start: 2018-11-16 | End: 2018-11-16 | Stop reason: SDUPTHER

## 2018-11-16 RX ORDER — SODIUM CHLORIDE 0.9 % (FLUSH) 0.9 %
10 SYRINGE (ML) INJECTION PRN
Status: DISCONTINUED | OUTPATIENT
Start: 2018-11-16 | End: 2018-11-16 | Stop reason: HOSPADM

## 2018-11-16 RX ADMIN — HYDROMORPHONE HYDROCHLORIDE 0.5 MG: 2 INJECTION INTRAMUSCULAR; INTRAVENOUS; SUBCUTANEOUS at 09:45

## 2018-11-16 RX ADMIN — OXYCODONE AND ACETAMINOPHEN 2 TABLET: 5; 325 TABLET ORAL at 12:31

## 2018-11-16 RX ADMIN — SUCCINYLCHOLINE CHLORIDE 160 MG: 20 INJECTION, SOLUTION INTRAMUSCULAR; INTRAVENOUS at 08:53

## 2018-11-16 RX ADMIN — FENTANYL CITRATE 100 MCG: 50 INJECTION INTRAMUSCULAR; INTRAVENOUS at 08:48

## 2018-11-16 RX ADMIN — SODIUM CHLORIDE: 9 INJECTION, SOLUTION INTRAVENOUS at 08:20

## 2018-11-16 RX ADMIN — GLYCOPYRROLATE 0.1 MG: 0.2 INJECTION, SOLUTION INTRAMUSCULAR; INTRAVENOUS at 08:49

## 2018-11-16 RX ADMIN — SODIUM CHLORIDE: 9 INJECTION, SOLUTION INTRAVENOUS at 08:49

## 2018-11-16 RX ADMIN — SODIUM CHLORIDE: 9 INJECTION, SOLUTION INTRAVENOUS at 09:32

## 2018-11-16 RX ADMIN — PROPOFOL 200 MG: 10 INJECTION, EMULSION INTRAVENOUS at 08:53

## 2018-11-16 RX ADMIN — Medication 2 G: at 08:49

## 2018-11-16 RX ADMIN — FENTANYL CITRATE 25 MCG: 50 INJECTION INTRAMUSCULAR; INTRAVENOUS at 11:18

## 2018-11-16 RX ADMIN — LIDOCAINE HYDROCHLORIDE 5 ML: 20 INJECTION, SOLUTION INFILTRATION; PERINEURAL at 08:53

## 2018-11-16 RX ADMIN — DEXAMETHASONE SODIUM PHOSPHATE 8 MG: 4 INJECTION, SOLUTION INTRAMUSCULAR; INTRAVENOUS at 09:06

## 2018-11-16 RX ADMIN — FENTANYL CITRATE 25 MCG: 50 INJECTION INTRAMUSCULAR; INTRAVENOUS at 11:06

## 2018-11-16 RX ADMIN — HYDROMORPHONE HYDROCHLORIDE 0.5 MG: 2 INJECTION INTRAMUSCULAR; INTRAVENOUS; SUBCUTANEOUS at 10:09

## 2018-11-16 RX ADMIN — MIDAZOLAM 2 MG: 1 INJECTION INTRAMUSCULAR; INTRAVENOUS at 08:48

## 2018-11-16 ASSESSMENT — PULMONARY FUNCTION TESTS
PIF_VALUE: 5
PIF_VALUE: 19
PIF_VALUE: 14
PIF_VALUE: 18
PIF_VALUE: 14
PIF_VALUE: 18
PIF_VALUE: 19
PIF_VALUE: 18
PIF_VALUE: 18
PIF_VALUE: 5
PIF_VALUE: 18
PIF_VALUE: 18
PIF_VALUE: 19
PIF_VALUE: 17
PIF_VALUE: 13
PIF_VALUE: 13
PIF_VALUE: 14
PIF_VALUE: 20
PIF_VALUE: 6
PIF_VALUE: 19
PIF_VALUE: 17
PIF_VALUE: 18
PIF_VALUE: 12
PIF_VALUE: 18
PIF_VALUE: 15
PIF_VALUE: 19
PIF_VALUE: 8
PIF_VALUE: 18
PIF_VALUE: 19
PIF_VALUE: 18
PIF_VALUE: 18
PIF_VALUE: 14
PIF_VALUE: 19
PIF_VALUE: 13
PIF_VALUE: 0
PIF_VALUE: 3
PIF_VALUE: 18
PIF_VALUE: 5
PIF_VALUE: 18
PIF_VALUE: 18
PIF_VALUE: 12
PIF_VALUE: 19
PIF_VALUE: 18
PIF_VALUE: 19
PIF_VALUE: 5
PIF_VALUE: 17
PIF_VALUE: 16
PIF_VALUE: 13
PIF_VALUE: 19
PIF_VALUE: 17
PIF_VALUE: 18
PIF_VALUE: 20
PIF_VALUE: 1
PIF_VALUE: 18
PIF_VALUE: 13
PIF_VALUE: 18
PIF_VALUE: 5
PIF_VALUE: 13
PIF_VALUE: 18
PIF_VALUE: 19
PIF_VALUE: 5
PIF_VALUE: 18
PIF_VALUE: 17
PIF_VALUE: 16
PIF_VALUE: 18
PIF_VALUE: 20
PIF_VALUE: 4
PIF_VALUE: 17
PIF_VALUE: 14
PIF_VALUE: 19
PIF_VALUE: 18
PIF_VALUE: 17
PIF_VALUE: 20
PIF_VALUE: 19
PIF_VALUE: 5
PIF_VALUE: 14
PIF_VALUE: 20
PIF_VALUE: 19
PIF_VALUE: 18
PIF_VALUE: 19
PIF_VALUE: 7
PIF_VALUE: 18
PIF_VALUE: 5
PIF_VALUE: 14
PIF_VALUE: 1
PIF_VALUE: 20
PIF_VALUE: 18
PIF_VALUE: 17
PIF_VALUE: 20
PIF_VALUE: 18
PIF_VALUE: 5
PIF_VALUE: 19
PIF_VALUE: 18
PIF_VALUE: 5
PIF_VALUE: 15
PIF_VALUE: 5
PIF_VALUE: 18
PIF_VALUE: 0
PIF_VALUE: 16
PIF_VALUE: 3
PIF_VALUE: 18

## 2018-11-16 ASSESSMENT — PAIN SCALES - GENERAL
PAINLEVEL_OUTOF10: 6
PAINLEVEL_OUTOF10: 7
PAINLEVEL_OUTOF10: 6

## 2018-11-16 ASSESSMENT — LIFESTYLE VARIABLES: SMOKING_STATUS: 0

## 2018-11-16 ASSESSMENT — PAIN DESCRIPTION - DESCRIPTORS: DESCRIPTORS: ACHING;DISCOMFORT;SORE

## 2018-11-16 ASSESSMENT — PAIN - FUNCTIONAL ASSESSMENT: PAIN_FUNCTIONAL_ASSESSMENT: 0-10

## 2018-11-16 ASSESSMENT — ENCOUNTER SYMPTOMS: SHORTNESS OF BREATH: 0

## 2018-11-16 NOTE — H&P
History:    Nanette Ruiz is a 50 y.o. right handed Black male here for right shoulder arthroscopy, revision biceps tenodesis. Past Medical History:   Diagnosis Date    Diabetes ([address]     Erectile dysfunction     Hyperlipidemia     Hypertension         No current facility-administered medications on file prior to encounter. Current Outpatient Prescriptions on File Prior to Encounter   Medication Sig Dispense Refill    METFORMIN HCL PO Take 500 mg by mouth 2 times daily (with meals)       LISINOPRIL PO Take 2.5 mg by mouth daily         No Known Allergies    Social History     Social History    Marital status:      Spouse name: N/A    Number of children: N/A    Years of education: N/A     Occupational History    Not on file. Social History Main Topics    Smoking status: Never Smoker    Smokeless tobacco: Never Used    Alcohol use Yes      Comment: Occ beer    Drug use: No    Sexual activity: Not on file     Other Topics Concern    Not on file     Social History Narrative    No narrative on file       History reviewed. No pertinent family history. General: negative  Psychological: negative  Ophthalmic: negative  ENT: negative  Hematological and Lymphatic: negative  Endocrine: negative  Respiratory: negative  Cardiovascular: negative  Gastrointestinal: negative  Genito-Urinary: negative  Musculoskeletal: negative except for biceps. Neurological: negative  Dermatological: negative        Physical Examination:      Ht 5' 10\" (1.778 m)   Wt 210 lb (95.3 kg)   BMI 30.13 kg/m²    Patient is awake, alert, and in no acute distress. Airway is intact  Chest: breathing comfortably  Heart: regular rate  Findings on exam of the body region where surgery is to be performed include: see last office and/or consult note          Assessment:       Right shoulder biceps pain / proximal biceps tendon rupture          Plan:       To OR for right shoulder arthroscopy, revision biceps

## 2018-11-16 NOTE — PROCEDURES
[employer] Department of Anesthesiology  Procedure Note - Interscalene Nerve Block (Single Shot)       Name:  Krupa Talley                                         Age:  50 y.o. MRN:  [id number]     ALLERGIES: Patient has no known allergies. NERVE BLOCK SPECIFICALLY REQUESTED FOR MANAGEMENT OF PAIN BY: Dr. July Yang  Risks and Benefits of the peripheral nerve block(s) was discussed and the patient was given the opportunity to ask questions. Informed consent was obtained. Time out performed prior to nerve block    PERIPHERAL NERVE BLOCK TYPE:  right Interscalene Nerve Block    POSITION: supine    ULTRASOUND GUIDANCE:  Yes    NERVE STIMULATOR GUIDED:  Yes. Appropriate Motor Evoked Response obtained at 2.0mAmp. The motor response was maintained with a current as low as 0.7mA. PRIMARY INDICATION:  Post operative analgesia    H&P STATUS: H&P was reviewed, the patient was examined and no change has occurred in patient's condition since H&P was completed. DIAGNOSTIC DATA REVIEW:  No results found for: PROTIME, INR, APTT    FULL BARRIER PRECAUTIONS & STERILE TECHNIQUE:    As documented on Pre-Procedure Check List  Chlorhexadine         TECHNIQUE:  The block was performed with a 22 gauge, 2in, Stimuplex needle. Injection was made through the needle 30ml Ropivacaine 0.5%. Injection was made incrementally with constant monitoring and aspiration every 5 mls.     CATHETER PLACED:  No    LOCAL ANESTHETIC:    Bolus: Ropivicaine 0.5% x 30mls   Infusion: None    Local anesthetic injected incrementally with intermittent aspiration negative for blood:  yes    COMPLICATIONS: no     Renata Saucedo MD  8:39 AM

## 2018-11-17 NOTE — OP NOTE
[address] [place] [address]                                OPERATIVE REPORT    PATIENT NAME: Carmen Bell                     :        1970  MED REC NO:   [id number]                          ROOM:  ACCOUNT NO:   [de-identified]                           ADMIT DATE: 2018  PROVIDER:     Massiel Bell MD    DATE OF PROCEDURE:  2018    PREOPERATIVE DIAGNOSIS:  Right long head of the biceps tendon rupture. POSTOPERATIVE DIAGNOSIS:  Right long head of the biceps tendon rupture. OPERATION PERFORMED:  Right shoulder arthroscopy, revision; open  subpectoral biceps tenodesis. SURGEON:  Massiel Bell MD    ASSISTANT:  Bobby Gomez. ANESTHESIA:  General plus nerve block. EBL:  Minimal.    COMPLICATIONS:  None. DISPOSITION:  To PACU in good condition. INDICATIONS:  The patient is a 47-year-old gentleman who was seen in the  office for work comp shoulder injury. He had a right shoulder  arthroscopy in 2018 where he was diagnosed with type 2 SLAP tear,  partial biceps tendon tear, and partial supraspinatus tear. He  underwent subacromial decompression, rotator cuff debridement, and an  intra-articular biceps tenodesis. In physical therapy, immediately  postop within just a couple of weeks after surgery, he felt a pop and he  was noted by the physical therapist to have a Andrea deformity. He was  seen in the office and diagnosed with rupture of the long head of the  biceps tendon from the tenodesis site. He continued to complain of  severe pain within his right biceps. This did not improve with physical  therapy. He also complained of the cosmetic deformity with the Andrea  deformity. He was thus recommended revision, open subpectoral biceps  tenodesis. This was eventually approved by [employer] and  he presents today for surgery.   He was explained the risks, benefits,  complications,

## 2018-11-19 ENCOUNTER — OFFICE VISIT (OUTPATIENT)
Dept: ORTHOPEDIC SURGERY | Age: 48
End: 2018-11-19

## 2018-11-19 VITALS — BODY MASS INDEX: 29.45 KG/M2 | HEIGHT: 70 IN | WEIGHT: 205.69 LBS | RESPIRATION RATE: 18 BRPM | HEART RATE: 72 BPM

## 2018-11-19 DIAGNOSIS — S46.211D TRAUMATIC PARTIAL TEAR OF RIGHT BICEPS TENDON, SUBSEQUENT ENCOUNTER: Primary | ICD-10-CM

## 2018-11-19 PROCEDURE — 99024 POSTOP FOLLOW-UP VISIT: CPT | Performed by: ORTHOPAEDIC SURGERY

## 2018-11-21 ENCOUNTER — TELEPHONE (OUTPATIENT)
Dept: ORTHOPEDIC SURGERY | Age: 48
End: 2018-11-21

## 2018-11-21 NOTE — TELEPHONE ENCOUNTER
Pt is calling stated he started coughing and he doesn't know if he pull something. He was in a great deal of pain.

## 2018-12-03 ENCOUNTER — TELEPHONE (OUTPATIENT)
Dept: ORTHOPEDIC SURGERY | Age: 48
End: 2018-12-03

## 2018-12-04 ENCOUNTER — TELEPHONE (OUTPATIENT)
Dept: ORTHOPEDIC SURGERY | Age: 48
End: 2018-12-04

## 2018-12-04 ENCOUNTER — OFFICE VISIT (OUTPATIENT)
Dept: ORTHOPEDIC SURGERY | Age: 48
End: 2018-12-04

## 2018-12-04 VITALS — RESPIRATION RATE: 17 BRPM | WEIGHT: 205.69 LBS | BODY MASS INDEX: 29.45 KG/M2 | HEART RATE: 72 BPM | HEIGHT: 70 IN

## 2018-12-04 DIAGNOSIS — S46.211D RUPTURE OF RIGHT PROXIMAL BICEPS TENDON, SUBSEQUENT ENCOUNTER: ICD-10-CM

## 2018-12-04 DIAGNOSIS — S43.431D SUPERIOR GLENOID LABRUM LESION OF RIGHT SHOULDER, SUBSEQUENT ENCOUNTER: Primary | ICD-10-CM

## 2018-12-04 PROCEDURE — 99024 POSTOP FOLLOW-UP VISIT: CPT | Performed by: ORTHOPAEDIC SURGERY

## 2018-12-04 RX ORDER — OXYCODONE HYDROCHLORIDE AND ACETAMINOPHEN 5; 325 MG/1; MG/1
1 TABLET ORAL 2 TIMES DAILY PRN
Qty: 14 TABLET | Refills: 0 | Status: SHIPPED | OUTPATIENT
Start: 2018-12-04 | End: 2018-12-11

## 2018-12-04 RX ORDER — OXYCODONE HYDROCHLORIDE AND ACETAMINOPHEN 5; 325 MG/1; MG/1
1 TABLET ORAL 2 TIMES DAILY PRN
Qty: 14 TABLET | Refills: 0 | Status: SHIPPED | OUTPATIENT
Start: 2018-12-04 | End: 2018-12-04

## 2018-12-04 NOTE — TELEPHONE ENCOUNTER
Patient states that he was told that his pain medication would be called in- but he checked pharmacy and it was not there    Pls call patient to let him know if this has been done [phone]

## 2018-12-04 NOTE — PROGRESS NOTES
Shoulder Arthroscopy Follow-up  Paris Mace is here for follow up after right shoulder arthroscopic surgery. Surgery date was 11/16/18. Findings at surgery: proximal biceps tendon rupture. Pain is controlled with current analgesics. Medication(s) being used: percocet. The patient's pain is rated at 10/10. He has been in a sling and non-weightbearing as instructed. He did call last week and say that he was choking on food and coughed and jerked his arm and also fell down as he was coughing. Physical Examination:  Pulse 72   Resp 17   Ht 5' 10\" (1.778 m)   Wt 205 lb 11 oz (93.3 kg)   BMI 29.51 kg/m²    Patient is awake, alert, and in no acute distress. The incisions are healing. Sensation is intact to light touch in the axillary, median, radial, and ulnar nerve distribution bilaterally. The EPL, FPL, and interossei are grossly intact bilaterally. The Bilateral upper extremities are warm and well-perfused with brisk capillary refill. No Andrea deformity. Assessment:   2 weeks status post right shoulder arthroscopy, revision biceps tenodesis      Plan:   Sutures were removed. Steri-strips and mastisol were applied. Patient may start taking baths or soaks at 4 weeks postop    Start physical therapy at 6 weeks postop. The patient may not advance their weight-bearing at the elbow. Refill pain medications as needed. Script for percocet refill. A prescription monitoring report was reviewed for the patient. Sling for 6 weeks total after surgery. No NSAIDs. Return to office at the 6 week postop time period for evaluation of progress or prn if problems.

## 2018-12-18 ENCOUNTER — TELEPHONE (OUTPATIENT)
Dept: ORTHOPEDIC SURGERY | Age: 48
End: 2018-12-18

## 2018-12-18 DIAGNOSIS — S43.431D SUPERIOR GLENOID LABRUM LESION OF RIGHT SHOULDER, SUBSEQUENT ENCOUNTER: Primary | ICD-10-CM

## 2018-12-18 DIAGNOSIS — S46.211D RUPTURE OF RIGHT PROXIMAL BICEPS TENDON, SUBSEQUENT ENCOUNTER: ICD-10-CM

## 2018-12-18 RX ORDER — OXYCODONE HYDROCHLORIDE AND ACETAMINOPHEN 5; 325 MG/1; MG/1
1 TABLET ORAL 2 TIMES DAILY PRN
Qty: 14 TABLET | Refills: 0 | Status: SHIPPED | OUTPATIENT
Start: 2018-12-18 | End: 2018-12-25

## 2019-01-08 ENCOUNTER — OFFICE VISIT (OUTPATIENT)
Dept: ORTHOPEDIC SURGERY | Age: 49
End: 2019-01-08

## 2019-01-08 VITALS — HEIGHT: 70 IN | BODY MASS INDEX: 29.35 KG/M2 | WEIGHT: 205 LBS | RESPIRATION RATE: 16 BRPM

## 2019-01-08 DIAGNOSIS — S43.431D SUPERIOR GLENOID LABRUM LESION OF RIGHT SHOULDER, SUBSEQUENT ENCOUNTER: Primary | ICD-10-CM

## 2019-01-08 DIAGNOSIS — S46.211D RUPTURE OF RIGHT PROXIMAL BICEPS TENDON, SUBSEQUENT ENCOUNTER: ICD-10-CM

## 2019-01-08 DIAGNOSIS — M67.911 TENDINOPATHY OF RIGHT ROTATOR CUFF: ICD-10-CM

## 2019-01-08 PROCEDURE — 99024 POSTOP FOLLOW-UP VISIT: CPT | Performed by: ORTHOPAEDIC SURGERY

## 2019-01-09 ENCOUNTER — TELEPHONE (OUTPATIENT)
Dept: ORTHOPEDIC SURGERY | Age: 49
End: 2019-01-09

## 2019-01-21 ENCOUNTER — TELEPHONE (OUTPATIENT)
Dept: ORTHOPEDIC SURGERY | Age: 49
End: 2019-01-21

## 2019-01-21 ENCOUNTER — HOSPITAL ENCOUNTER (OUTPATIENT)
Dept: PHYSICAL THERAPY | Age: 49
Setting detail: THERAPIES SERIES
Discharge: HOME | End: 2019-01-21
Payer: COMMERCIAL

## 2019-01-21 DIAGNOSIS — S46.211D RUPTURE OF RIGHT PROXIMAL BICEPS TENDON, SUBSEQUENT ENCOUNTER: Primary | ICD-10-CM

## 2019-01-21 PROCEDURE — 97530 THERAPEUTIC ACTIVITIES: CPT | Performed by: CHIROPRACTOR

## 2019-01-21 PROCEDURE — G0283 ELEC STIM OTHER THAN WOUND: HCPCS | Performed by: CHIROPRACTOR

## 2019-01-21 PROCEDURE — 97161 PT EVAL LOW COMPLEX 20 MIN: CPT | Performed by: CHIROPRACTOR

## 2019-01-21 RX ORDER — OXYCODONE HYDROCHLORIDE AND ACETAMINOPHEN 5; 325 MG/1; MG/1
1 TABLET ORAL EVERY 4 HOURS PRN
Qty: 14 TABLET | Refills: 0 | Status: SHIPPED | OUTPATIENT
Start: 2019-01-21 | End: 2019-01-28

## 2019-01-23 ENCOUNTER — HOSPITAL ENCOUNTER (OUTPATIENT)
Dept: PHYSICAL THERAPY | Age: 49
Setting detail: THERAPIES SERIES
Discharge: HOME | End: 2019-01-23
Payer: COMMERCIAL

## 2019-01-23 PROCEDURE — G0283 ELEC STIM OTHER THAN WOUND: HCPCS | Performed by: CHIROPRACTOR

## 2019-01-23 PROCEDURE — 97140 MANUAL THERAPY 1/> REGIONS: CPT | Performed by: CHIROPRACTOR

## 2019-01-28 ENCOUNTER — HOSPITAL ENCOUNTER (OUTPATIENT)
Dept: PHYSICAL THERAPY | Age: 49
Setting detail: THERAPIES SERIES
Discharge: HOME | End: 2019-01-28
Payer: COMMERCIAL

## 2019-01-28 PROCEDURE — 97140 MANUAL THERAPY 1/> REGIONS: CPT | Performed by: CHIROPRACTOR

## 2019-01-28 PROCEDURE — G0283 ELEC STIM OTHER THAN WOUND: HCPCS | Performed by: CHIROPRACTOR

## 2019-01-31 ENCOUNTER — HOSPITAL ENCOUNTER (OUTPATIENT)
Dept: PHYSICAL THERAPY | Age: 49
Setting detail: THERAPIES SERIES
Discharge: HOME | End: 2019-01-31
Payer: COMMERCIAL

## 2019-01-31 PROCEDURE — 97110 THERAPEUTIC EXERCISES: CPT | Performed by: CHIROPRACTOR

## 2019-01-31 PROCEDURE — G0283 ELEC STIM OTHER THAN WOUND: HCPCS | Performed by: CHIROPRACTOR

## 2019-02-04 ENCOUNTER — HOSPITAL ENCOUNTER (OUTPATIENT)
Dept: PHYSICAL THERAPY | Age: 49
Setting detail: THERAPIES SERIES
Discharge: HOME | End: 2019-02-04
Payer: COMMERCIAL

## 2019-02-04 PROCEDURE — 97110 THERAPEUTIC EXERCISES: CPT | Performed by: CHIROPRACTOR

## 2019-02-04 PROCEDURE — G0283 ELEC STIM OTHER THAN WOUND: HCPCS | Performed by: CHIROPRACTOR

## 2019-02-07 ENCOUNTER — HOSPITAL ENCOUNTER (OUTPATIENT)
Dept: PHYSICAL THERAPY | Age: 49
Setting detail: THERAPIES SERIES
Discharge: HOME | End: 2019-02-07
Payer: COMMERCIAL

## 2019-02-07 PROCEDURE — G0283 ELEC STIM OTHER THAN WOUND: HCPCS | Performed by: CHIROPRACTOR

## 2019-02-07 PROCEDURE — 97110 THERAPEUTIC EXERCISES: CPT | Performed by: CHIROPRACTOR

## 2019-02-11 ENCOUNTER — HOSPITAL ENCOUNTER (OUTPATIENT)
Dept: PHYSICAL THERAPY | Age: 49
Setting detail: THERAPIES SERIES
Discharge: HOME | End: 2019-02-11
Payer: COMMERCIAL

## 2019-02-11 PROCEDURE — 97110 THERAPEUTIC EXERCISES: CPT | Performed by: CHIROPRACTOR

## 2019-02-11 PROCEDURE — G0283 ELEC STIM OTHER THAN WOUND: HCPCS | Performed by: CHIROPRACTOR

## 2019-02-14 ENCOUNTER — HOSPITAL ENCOUNTER (OUTPATIENT)
Dept: PHYSICAL THERAPY | Age: 49
Setting detail: THERAPIES SERIES
Discharge: HOME | End: 2019-02-14
Payer: COMMERCIAL

## 2019-02-14 ENCOUNTER — APPOINTMENT (OUTPATIENT)
Dept: PHYSICAL THERAPY | Age: 49
End: 2019-02-14
Payer: COMMERCIAL

## 2019-02-14 PROCEDURE — G0283 ELEC STIM OTHER THAN WOUND: HCPCS

## 2019-02-14 PROCEDURE — 97110 THERAPEUTIC EXERCISES: CPT

## 2019-02-18 ENCOUNTER — HOSPITAL ENCOUNTER (OUTPATIENT)
Dept: PHYSICAL THERAPY | Age: 49
Setting detail: THERAPIES SERIES
Discharge: HOME | End: 2019-02-18
Payer: COMMERCIAL

## 2019-02-18 PROCEDURE — 97110 THERAPEUTIC EXERCISES: CPT | Performed by: CHIROPRACTOR

## 2019-02-18 PROCEDURE — G0283 ELEC STIM OTHER THAN WOUND: HCPCS | Performed by: CHIROPRACTOR

## 2019-02-21 ENCOUNTER — HOSPITAL ENCOUNTER (OUTPATIENT)
Dept: PHYSICAL THERAPY | Age: 49
Setting detail: THERAPIES SERIES
Discharge: HOME | End: 2019-02-21
Payer: COMMERCIAL

## 2019-02-21 NOTE — FLOWSHEET NOTE
Physical Therapy  Cancellation/No-show Note  Patient Name:  Sal Rendon  :  1970   Date:  2019  Cancelled visits to date: 1  No-shows to date: 0    For today's appointment patient:  []  Cancelled  []  Rescheduled appointment  []  No-show     Reason given by patient:  [x]  Patient ill - flu  []  Conflicting appointment  []  No transportation    []  Conflict with work  []  No reason given  []  Other:     Comments:      Electronically signed by:  Debra Lynch, PTA  [id number]

## 2019-02-25 ENCOUNTER — HOSPITAL ENCOUNTER (OUTPATIENT)
Dept: PHYSICAL THERAPY | Age: 49
Setting detail: THERAPIES SERIES
Discharge: HOME | End: 2019-02-25
Payer: COMMERCIAL

## 2019-02-25 PROCEDURE — G0283 ELEC STIM OTHER THAN WOUND: HCPCS | Performed by: CHIROPRACTOR

## 2019-02-25 PROCEDURE — 97110 THERAPEUTIC EXERCISES: CPT | Performed by: CHIROPRACTOR

## 2019-02-28 ENCOUNTER — HOSPITAL ENCOUNTER (OUTPATIENT)
Dept: PHYSICAL THERAPY | Age: 49
Setting detail: THERAPIES SERIES
Discharge: HOME | End: 2019-02-28
Payer: COMMERCIAL

## 2019-02-28 PROCEDURE — G0283 ELEC STIM OTHER THAN WOUND: HCPCS

## 2019-02-28 PROCEDURE — 97110 THERAPEUTIC EXERCISES: CPT

## 2019-03-04 ENCOUNTER — HOSPITAL ENCOUNTER (OUTPATIENT)
Dept: PHYSICAL THERAPY | Age: 49
Setting detail: THERAPIES SERIES
Discharge: HOME | End: 2019-03-04
Payer: COMMERCIAL

## 2019-03-04 PROCEDURE — 97110 THERAPEUTIC EXERCISES: CPT | Performed by: CHIROPRACTOR

## 2019-03-04 PROCEDURE — G0283 ELEC STIM OTHER THAN WOUND: HCPCS | Performed by: CHIROPRACTOR

## 2019-03-12 ENCOUNTER — OFFICE VISIT (OUTPATIENT)
Dept: ORTHOPEDIC SURGERY | Age: 49
End: 2019-03-12
Payer: COMMERCIAL

## 2019-03-12 VITALS
HEIGHT: 70 IN | DIASTOLIC BLOOD PRESSURE: 82 MMHG | WEIGHT: 205 LBS | BODY MASS INDEX: 29.35 KG/M2 | RESPIRATION RATE: 16 BRPM | SYSTOLIC BLOOD PRESSURE: 122 MMHG

## 2019-03-12 DIAGNOSIS — S43.431D SUPERIOR GLENOID LABRUM LESION OF RIGHT SHOULDER, SUBSEQUENT ENCOUNTER: ICD-10-CM

## 2019-03-12 DIAGNOSIS — S46.211D RUPTURE OF RIGHT PROXIMAL BICEPS TENDON, SUBSEQUENT ENCOUNTER: Primary | ICD-10-CM

## 2019-03-12 PROCEDURE — 99213 OFFICE O/P EST LOW 20 MIN: CPT | Performed by: ORTHOPAEDIC SURGERY

## 2019-03-21 ENCOUNTER — TELEPHONE (OUTPATIENT)
Dept: ORTHOPEDIC SURGERY | Age: 49
End: 2019-03-21

## 2019-03-22 ENCOUNTER — TELEPHONE (OUTPATIENT)
Dept: ORTHOPEDIC SURGERY | Age: 49
End: 2019-03-22

## 2019-04-01 ENCOUNTER — HOSPITAL ENCOUNTER (OUTPATIENT)
Dept: PHYSICAL THERAPY | Age: 49
Setting detail: THERAPIES SERIES
Discharge: HOME | End: 2019-04-01
Payer: COMMERCIAL

## 2019-04-01 PROCEDURE — 97110 THERAPEUTIC EXERCISES: CPT

## 2019-04-01 PROCEDURE — G0283 ELEC STIM OTHER THAN WOUND: HCPCS

## 2019-04-01 NOTE — FLOWSHEET NOTE
Physical Therapy Daily Treatment Note  Date:  2019    Patient Name:  Garry Alston    :  1970  MRN: [id number]  Restrictions/Precautions:    Pertinent Medical History:  DM, HTN  Medical/Treatment Diagnosis Information:  · Diagnosis: Biceps tendon tear/ Repair     Insurance/Certification information:   [facility]  Physician Information:     Dr. Sasha Michaels  F/U 19  Plan of care signed (Y/N):  Inbox  Visit# / total visits:   +  Pain level: 6/10     G-Code (if applicable):      Date / Visit # G-Code Applied:  /  PT G-Codes  Functional Assessment Tool Used: UEFS  Score: 11.25    Progress Note: []  Yes  [x]  No  Next due by: Visit #10      History of Injury:  Past h/o SAD/ debridement/ biceps tenodesis (18)                                     Subsequent tear of long head of Biceps Tendon. Repair 18    Subjective: resuming PT with WC authorization. Reports tightness/ pain ant R shld.       Objective:  Observation:   Test measurements:  PROM: Shoulder flex 0-100, Abd 0-70, ER 0-60 (19)                                                                          0-115         0-90        0-75  (19)                                                                          0-135         0-120      0-90  (19)                                                                          0-150         0-140      0-90  (19)                                                                           0-150        0-140      0-90  (3/4/19)    Exercises:  Exercise/Equipment Resistance/Repetitions Other comments        UBE  F/B X 3 min    (easy)    Pulleys X 1 min         UE Buchanan Flex - x 10 R  abd x 10  R Does better with ROM on Buchanan than in supine stretching             Passive ROM of R shoulder 10 min    Active elbow Elbow flex/ext   (stand) 1# - 2x10 R    protraction 2# - 2x10 R    Single arm press 2# - 2x10 R    Single arm circles at 90 2# - 1x10    CW/CCW                   Digiflex  Red  2x20 R E-stim IFC with CP                    BWC TIME CODES         MODALITY                      START TIME   STOP TIME  Thera Ex   2:30   3:00     Estim 3:00 3:15                   Other Therapeutic Activities:      Home Exercise Program:   Voiced understanding of home exer    Manual Treatments:      Modalities:  E-stim, CP    Timed Code Treatment Minutes:  30    Total Treatment Minutes:  45    Assessment:  [] Patient tolerated treatment well [] Patient limited by fatigue  [] Patient limited by pain  [] Patient limited by other medical complications  [x] Other: resumed previous ex with return to PT.      Prognosis: [x] Good [] Fair  [] Poor    Patient Requires Follow-up: [x] Yes  [] No    Plan:   [x] Continue per plan of care [] Alter current plan (see comments)  [] Plan of care initiated [] Hold pending MD visit [] Discharge    Plan for Next Session:  Progress ex per PT    Electronically signed by:  Ivis Walton,

## 2019-04-03 ENCOUNTER — HOSPITAL ENCOUNTER (OUTPATIENT)
Dept: PHYSICAL THERAPY | Age: 49
Setting detail: THERAPIES SERIES
Discharge: HOME | End: 2019-04-03
Payer: COMMERCIAL

## 2019-04-03 PROCEDURE — 97110 THERAPEUTIC EXERCISES: CPT | Performed by: CHIROPRACTOR

## 2019-04-03 PROCEDURE — G0283 ELEC STIM OTHER THAN WOUND: HCPCS | Performed by: CHIROPRACTOR

## 2019-04-03 NOTE — FLOWSHEET NOTE
Physical Therapy Daily Treatment Note  Date:  4/3/2019    Patient Name:  Whitney Brewster    :  1970  MRN: [id number]  Restrictions/Precautions:    Pertinent Medical History:  DM, HTN  Medical/Treatment Diagnosis Information:  · Diagnosis: Biceps tendon tear/ Repair     Insurance/Certification information:   [facility]  Physician Information:     Dr. Cailin Odonnell  F/U 19  Plan of care signed (Y/N):  Inbox  Visit# / total visits:     (by 19)  Pain level: 6/10     G-Code (if applicable):      Date / Visit # G-Code Applied:  /  PT G-Codes  Functional Assessment Tool Used: UEFS  Score: 11.25    Progress Note: []  Yes  [x]  No  Next due by: Visit #10      History of Injury:  Past h/o SAD/ debridement/ biceps tenodesis (18)                                     Subsequent tear of long head of Biceps Tendon. Repair 18    Subjective:  Tolerating exer better, but still has intermittent muscle guarding with stretches    Objective:  Observation:   Test measurements:  PROM: Shoulder flex 0-100, Abd 0-70, ER 0-60 (19)                                                                          0-115         0-90        0-75  (19)                                                                          0-135         0-120      0-90  (19)                                                                          0-150         0-140      0-90  (19)                                                                           0-150        0-140      0-90  (3/4/19)    Exercises:  Exercise/Equipment Resistance/Repetitions Other comments        UBE  F/B X 3 min    (easy)    Pulleys X 1 min         UE Shattuck 4-way   x10 Does better with ROM on Shattuck than in supine stretching   T-slide - rows                ext                IR/ER      Green - 2x10  Green - 2x10         Passive ROM of R shoulder 10 min    Active elbow Elbow flex/ext   (stand) 2# - 2x10 R    protraction 3# - 2x10 R    Single arm press 3# - 2x10 R    Single arm circles at 90 3# - 1x10    CW/CCW    Prone horiz abd               Digiflex  Green -  2x20 R               E-stim IFC with CPx 15 min                    BWC TIME CODES         MODALITY                      START TIME   STOP TIME  Thera Ex   2:00   2:30     Estim 2:30 2:45                   Other Therapeutic Activities:      Home Exercise Program:   Voiced understanding of home exer    Manual Treatments:      Modalities:  E-stim, CP    Timed Code Treatment Minutes:  30    Total Treatment Minutes:  45    Assessment:  [] Patient tolerated treatment well [] Patient limited by fatigue  [] Patient limited by pain  [] Patient limited by other medical complications  [x] Other: resumed previous ex with return to PT.      Prognosis: [x] Good [] Fair  [] Poor    Patient Requires Follow-up: [x] Yes  [] No    Plan:   [x] Continue per plan of care [] Alter current plan (see comments)  [] Plan of care initiated [] Hold pending MD visit [] Discharge    Plan for Next Session:  Increase exer as indicated    Electronically signed by:  Kayleigh BLOUNT#[id number]

## 2019-04-08 ENCOUNTER — HOSPITAL ENCOUNTER (OUTPATIENT)
Dept: PHYSICAL THERAPY | Age: 49
Setting detail: THERAPIES SERIES
Discharge: HOME | End: 2019-04-08
Payer: COMMERCIAL

## 2019-04-08 PROCEDURE — 97110 THERAPEUTIC EXERCISES: CPT

## 2019-04-08 PROCEDURE — G0283 ELEC STIM OTHER THAN WOUND: HCPCS

## 2019-04-08 NOTE — FLOWSHEET NOTE
Physical Therapy Daily Treatment Note  Date:  2019    Patient Name:  Carmel Tee    :  1970  MRN: [id number]  Restrictions/Precautions:    Pertinent Medical History:  DM, HTN  Medical/Treatment Diagnosis Information:  · Diagnosis: Biceps tendon tear/ Repair     Insurance/Certification information:   [facility]  Physician Information:     Dr. Jesse Michael  F/U 19  Plan of care signed (Y/N):  Inbox  Visit# / total visits:  15/28   (by 19)  Pain level: 6/10     G-Code (if applicable):      Date / Visit # G-Code Applied:  /  PT G-Codes  Functional Assessment Tool Used: UEFS  Score: 11.25    Progress Note: []  Yes  [x]  No  Next due by: Visit #10      History of Injury:  Past h/o SAD/ debridement/ biceps tenodesis (18)                                     Subsequent tear of long head of Biceps Tendon.  Repair 18    Subjective:     Objective:  Observation:   Test measurements:  PROM: Shoulder flex 0-100, Abd 0-70, ER 0-60 (19)                                                                          0-115         0-90        0-75  (19)                                                                          0-135         0-120      0-90  (19)                                                                          0-150         0-140      0-90  (19)                                                                           0-150        0-140      0-90  (3/4/19)    Exercises:  Exercise/Equipment Resistance/Repetitions Other comments        UBE  F/B x3 min    (easy)    Pulleys x1 min    UE [place] 4-way 10x each  Does better with ROM on [place] than in supine stretching   T-slide - rows                ext                IR/ER                Triceps       Green - 2x10  Green - 2x10  Red 2x10 each R  Green 2x10     Bicep curl 2# 2x10 R         Supine protraction 3# 2x10 R    Single arm press 3# 2x10 R    Single arm circles at 90 3# 1x10 R  CW/CCW    Prone horiz abd add    Supine flexion 1# 1x10 R     SL abd        ER 1# 1x10 R  1# 1x10 R         PROM R shoulder 4-way 10x each     STM R bicep x5 mins    IFC with CP x15 mins to R shldr/bicep          BWC TIME CODES      MODALITY                      START TIME   STOP TIME  Thera Ex   10:40   11:15     Estim 11:15 11:30       Other Therapeutic Activities:      Home Exercise Program:   Voiced understanding of home exer    Manual Treatments:      Modalities:  E-stim, CP    Timed Code Treatment Minutes:  35    Total Treatment Minutes:  50    Assessment:  [] Patient tolerated treatment well [] Patient limited by fatigue  [] Patient limited by pain  [] Patient limited by other medical complications  [] Other: 4/8: good tolerance to STM at R bicep. Prognosis: [x] Good [] Fair  [] Poor    Patient Requires Follow-up: [x] Yes  [] No    Plan:   [x] Continue per plan of care [] Alter current plan (see comments)  [] Plan of care initiated [] Hold pending MD visit [] Discharge    Plan for Next Session:  Add above as stated.   Assess STM to bicep    Electronically signed by:  Reinaldo Wagner, [id number] [address]

## 2019-04-10 ENCOUNTER — HOSPITAL ENCOUNTER (OUTPATIENT)
Dept: PHYSICAL THERAPY | Age: 49
Setting detail: THERAPIES SERIES
Discharge: HOME | End: 2019-04-10
Payer: COMMERCIAL

## 2019-04-10 PROCEDURE — 97110 THERAPEUTIC EXERCISES: CPT

## 2019-04-10 NOTE — FLOWSHEET NOTE
press 3# 2x10 R    Single arm circles at 90 3# 1x10 R  CW/CCW    Prone horiz abd                      ext 1# 1x10 R  add    Supine flexion  1# 2x10 R     SL abd        ER 1# 2x10 R  1# 2x10 R         PROM R shoulder 4-way 10x each     STM R bicep x5 mins    IFC with CP x15 mins to R shldr/bicep          BWC TIME CODES      MODALITY                      START TIME   STOP TIME  Thera Ex   10:00   10:40   Estim 10:40 10:55       Other Therapeutic Activities:      Home Exercise Program:   Voiced understanding of home exer    Manual Treatments:      Modalities:  E-stim, CP    Timed Code Treatment Minutes:  40    Total Treatment Minutes:  55    Assessment:  [] Patient tolerated treatment well [] Patient limited by fatigue  [] Patient limited by pain  [] Patient limited by other medical complications  [] Other: 4/10: slight increased swelling noted at anterior superior shoulder. Prognosis: [x] Good [] Fair  [] Poor    Patient Requires Follow-up: [x] Yes  [] No    Plan:   [x] Continue per plan of care [] Alter current plan (see comments)  [] Plan of care initiated [] Hold pending MD visit [] Discharge    Plan for Next Session:  Add above as stated.       Electronically signed by:  Bekah Quinones, [id number] [address]

## 2019-04-11 ENCOUNTER — TELEPHONE (OUTPATIENT)
Dept: ORTHOPEDIC SURGERY | Age: 49
End: 2019-04-11

## 2019-04-11 NOTE — TELEPHONE ENCOUNTER
Pt is calling about his arm/ RTC swelling up to the size of a grapefruit  after PT yesterday. He wanted to know what to do. Please advise.

## 2019-04-15 ENCOUNTER — TELEPHONE (OUTPATIENT)
Dept: ORTHOPEDIC SURGERY | Age: 49
End: 2019-04-15

## 2019-04-15 ENCOUNTER — HOSPITAL ENCOUNTER (OUTPATIENT)
Dept: PHYSICAL THERAPY | Age: 49
Setting detail: THERAPIES SERIES
Discharge: HOME | End: 2019-04-15
Payer: COMMERCIAL

## 2019-04-15 NOTE — FLOWSHEET NOTE
Single arm press    Single arm circles at 90    Prone horiz abd                      ext    Supine flexion     SL abd        ER         PROM R shoulder 4-way 10x each  Gentle due to increased pain. STM R bicep x5 mins Gentle TTP ant/ lat shld   IFC with CP x15 mins to R shldr           BWC TIME CODES      MODALITY                      START TIME   STOP TIME  Thera Ex   10:05   10:25   Estim 10:25 10:40       Other Therapeutic Activities:  4/15/19  Checked patients ROM after complaints of increased swelling and pain after last Rx. MD instructed him to rest and ice. Flexion  135° PER, ABD 40° with sharp pain, ER WFL, IR 48° with PER    Home Exercise Program:   Voiced understanding of home exer    Manual Treatments:      Modalities:  E-stim, CP    Timed Code Treatment Minutes:  20    Total Treatment Minutes:  35    Assessment:  [] Patient tolerated treatment well [] Patient limited by fatigue  [x] Patient limited by pain  [] Patient limited by other medical complications  [x] Other: checked per PT, ex modified due to increased pain/ edema. Instructed to call MD re:  possible zenaida bicep muscle per PT.      Prognosis: [x] Good [] Fair  [] Poor    Patient Requires Follow-up: [x] Yes  [] No    Plan:   [x] Continue per plan of care [] Alter current plan (see comments)  [] Plan of care initiated [] Hold pending MD visit [] Discharge    Plan for Next Session:  F/U with MD per PT    Electronically signed by:  Audrey Mayen

## 2019-04-15 NOTE — TELEPHONE ENCOUNTER
Called PT. Sarah Thorpe stated that patient left. Called patient. Made appointment for him to be seen tomorrow.

## 2019-04-15 NOTE — TELEPHONE ENCOUNTER
[facility] patient PT states that there are concerns regarding patient bicep. She is stating that patient has possibly return the bicep tendon. There is a good amount of swelling and pain since 4/10/2019. Patient has been resting and using ice but nothing is helping his symptoms.     Call Lilliana Kennedy, PT to advise on patient care:  [phone]

## 2019-04-16 ENCOUNTER — OFFICE VISIT (OUTPATIENT)
Dept: ORTHOPEDIC SURGERY | Age: 49
End: 2019-04-16
Payer: COMMERCIAL

## 2019-04-16 VITALS
HEIGHT: 70 IN | WEIGHT: 205.03 LBS | DIASTOLIC BLOOD PRESSURE: 78 MMHG | HEART RATE: 75 BPM | BODY MASS INDEX: 29.35 KG/M2 | RESPIRATION RATE: 18 BRPM | SYSTOLIC BLOOD PRESSURE: 116 MMHG

## 2019-04-16 DIAGNOSIS — S46.211D RUPTURE OF RIGHT PROXIMAL BICEPS TENDON, SUBSEQUENT ENCOUNTER: Primary | ICD-10-CM

## 2019-04-16 DIAGNOSIS — S43.431D SUPERIOR GLENOID LABRUM LESION OF RIGHT SHOULDER, SUBSEQUENT ENCOUNTER: ICD-10-CM

## 2019-04-16 PROCEDURE — 99213 OFFICE O/P EST LOW 20 MIN: CPT | Performed by: ORTHOPAEDIC SURGERY

## 2019-04-16 NOTE — PROGRESS NOTES
History:  Sal Rendon is here for follow up after right shoulder arthroscopic surgery. Surgery date was 11/16/18. Findings at surgery: proximal biceps tendon rupture. Pain is controlled with current analgesics. Medication(s) being used: percocet. The patient's pain is rated at 8/10. He his doing PT at [facility].  He states he worked with a different PT and they had him doing more weight than he was able and he had pain. 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 Physical Examination:  /78   Pulse 75   Resp 18   Ht 5' 10\" (1.778 m)   Wt 205 lb 0.4 oz (93 kg)   BMI 29.42 kg/m²     Patient is awake, alert, and in no acute distress. Sensation is intact to light touch in the axillary, median, radial, and ulnar nerve distribution bilaterally. The EPL, FPL, and interossei are grossly intact bilaterally. The Bilateral upper extremities are warm and well-perfused with brisk capillary refill. Andrea deformity. Right shoulder active forward flexion 135, passive 180, ER 45, IR back pocket  4-5-/5 Supraspinatus, External rotation, Internal rotation        Assessment:   4 months status post right shoulder arthroscopy, revision biceps tenodesis      Plan:   Hold PT for now. Will have him see Dr. Lorrie Queen for ultrasound to see if biceps tendon re-tore from subpectoral tenodesis site. Hold PT for now. Ice / heat prn. NSAIDs and Tylenol prn. Follow up after ultrasound.

## 2019-04-17 ENCOUNTER — HOSPITAL ENCOUNTER (OUTPATIENT)
Dept: PHYSICAL THERAPY | Age: 49
Setting detail: THERAPIES SERIES
End: 2019-04-17
Payer: COMMERCIAL

## 2019-04-22 ENCOUNTER — APPOINTMENT (OUTPATIENT)
Dept: PHYSICAL THERAPY | Age: 49
End: 2019-04-22
Payer: COMMERCIAL

## 2019-04-24 ENCOUNTER — APPOINTMENT (OUTPATIENT)
Dept: PHYSICAL THERAPY | Age: 49
End: 2019-04-24
Payer: COMMERCIAL

## 2019-04-24 ENCOUNTER — TELEPHONE (OUTPATIENT)
Dept: ORTHOPEDIC SURGERY | Age: 49
End: 2019-04-24

## 2019-04-29 ENCOUNTER — APPOINTMENT (OUTPATIENT)
Dept: PHYSICAL THERAPY | Age: 49
End: 2019-04-29
Payer: COMMERCIAL

## 2019-04-30 ENCOUNTER — TELEPHONE (OUTPATIENT)
Dept: ORTHOPEDIC SURGERY | Age: 49
End: 2019-04-30

## 2019-05-01 ENCOUNTER — TELEPHONE (OUTPATIENT)
Dept: ORTHOPEDIC SURGERY | Age: 49
End: 2019-05-01

## 2019-05-01 NOTE — TELEPHONE ENCOUNTER
I tried to call patient to schedule him with Dr Artis Guerra [facility]  Approved the ultrasound but his mail box was full so I will try again tomorrow

## 2019-05-02 ENCOUNTER — TELEPHONE (OUTPATIENT)
Dept: ORTHOPEDIC SURGERY | Age: 49
End: 2019-05-02

## 2019-05-02 NOTE — TELEPHONE ENCOUNTER
I called his number again and was unable to leave a message his mail box is full. So I called his sister Henry Dominguez and she guicho ltext him and tell him to call me  We need to schedule an appt with Dr Óscar Winslow for shoulder ultrasound that was approved through [facility]   We need 40 minutes for this.

## 2019-05-03 ENCOUNTER — TELEPHONE (OUTPATIENT)
Dept: ORTHOPEDIC SURGERY | Age: 49
End: 2019-05-03

## 2019-05-08 ENCOUNTER — TELEPHONE (OUTPATIENT)
Dept: ORTHOPEDIC SURGERY | Age: 49
End: 2019-05-08

## 2019-05-28 ENCOUNTER — OFFICE VISIT (OUTPATIENT)
Dept: ORTHOPEDIC SURGERY | Age: 49
End: 2019-05-28
Payer: COMMERCIAL

## 2019-05-28 VITALS
DIASTOLIC BLOOD PRESSURE: 69 MMHG | SYSTOLIC BLOOD PRESSURE: 120 MMHG | WEIGHT: 205 LBS | HEART RATE: 62 BPM | HEIGHT: 70 IN | BODY MASS INDEX: 29.35 KG/M2

## 2019-05-28 DIAGNOSIS — S46.911A STRAIN OF RIGHT SHOULDER, INITIAL ENCOUNTER: ICD-10-CM

## 2019-05-28 DIAGNOSIS — S46.211D RUPTURE OF RIGHT PROXIMAL BICEPS TENDON, SUBSEQUENT ENCOUNTER: Primary | ICD-10-CM

## 2019-05-28 DIAGNOSIS — M75.51 BURSITIS OF SHOULDER, RIGHT: ICD-10-CM

## 2019-05-28 DIAGNOSIS — M67.911 DISORDER OF RIGHT ROTATOR CUFF: ICD-10-CM

## 2019-05-28 PROCEDURE — 99212 OFFICE O/P EST SF 10 MIN: CPT | Performed by: ORTHOPAEDIC SURGERY

## 2019-05-28 NOTE — PATIENT INSTRUCTIONS
Impression:   1. There appears to be discontinuity of biceps tendon at the level of the subpectoralis tenodesis. The tendon appears to be ruptured and retracted further distally. Plan/Treatment:   1. Findings are briefly discussed with Rebecca Parra and his wife. 2. He is return to Dr. Chevy gary.       Darnell Jenkins MD  5/28/2019

## 2019-05-28 NOTE — PROGRESS NOTES
Follow Up Shoulder Evaluation   5/28/2019    Martin Sibley      1970        Mandy Lozano is seen in follow up for Follow-up (Right shoulder diagnostic ultrasound. Sent by Dr Yony Chang. This is a [facility] claim from 5/17/17)       This is a 24-year-old patient sent by Dr. Yony Chang for evaluation of a Sub- pectoralis biceps tendon repair. He is a Worker's Compensation patient whose original injury to the right biceps tendon occurred on 5/17/17. He underwent an arthroscopic intra-articular biceps tendon repair at his initial surgery on June 1, 2018. Unfortunately he had rupture of his repair during physical therapy and required a second operation on November 16, 2018 for an open subpectoralis tenodesis of the biceps tendon. He did well again until a couple months later during physical therapy when he had sudden recurrence of the appearance of Andrea deformity. Because of concern about possible rupture of his tenodesis from its subpectoralis tenodesis site he is sent to my office by Dr. Yony Chang for ultrasound evaluation. He continues to complain of intermittent pain in his right shoulder with no pain at rest but pain up to 8 with activity such as reaching up. Does complain of some associated numbness in his right hand and is limited in his activities of daily living. 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      Pertinent items are noted in HPI  Review of systems reviewed from Patient History Form dated on 11/19/18 and available in the patient's chart under the Media tab. The patient's past medical history, medications, allergies, family history, social history, HPI have been reviewed, dated, and recorded in the chart.      /69   Pulse 62   Ht 5' 10\" (1.778 m)   Wt 205 lb (93 kg)   BMI 29.41 kg/m²     PHYSICAL EXAMINATION    General Appearance: no acute distress, alert, oriented x 3, appropriate mood and affect  Atrophy: No  Ecchymosis: No   Errythemia: No  Swelling: No   Deformity: Yes poppey  Palpation/Tenderness: no      ---------------------------------------------------------------------------------------------------------------------  ----------------------------------------------------------------------------------------------------------------------    [facility] Diagnostic Ultrasound. Diagnostic evaluation of the right Shoulder : The structures of the Right shoulder were visualized using the  [employer] System Enstratius 18/4 linear transducer  A limited examination was performed concentrating on the structures surrounding the biceps tendon of the anterior right shoulder. · The Long Head of the Biceps was evaluated in both long and short axis : In the bicipital groove at the level of the subscapularis tendon there is seen to be a stump of tendon still present however this disappears when moving distally in both short and long axis. When attention was then turned to the site of the sub pectoralis tenodesis, the Bio-Tenodesis screw is identified at the tenodesis site. Just below the tenodesis screw the biceps tendon disappears and is found a couple of centimeters distal as a tendon stump. · The Subscapularis tendon was evaluated in long axis appears normal.  ·  The Supraspinatus tendon was briefly  examined and appears normal dave  long axis.    ---------------------------------------------------------------------------------------------------------------------  ---------------------------------------------------------------------------------------------------------------------      Impression:   1. There appears to be discontinuity of biceps tendon at the level of the subpectoralis tenodesis. The tendon appears to be ruptured and retracted further distally. Plan/Treatment:   1. Findings are briefly discussed with Derek Carter and his wife. 2. He is return to Dr. Nirmala gary.       Josee Sheets MD  5/28/2019    This dictation was done with Dragon dictation and may contain mechanical errors related to translation.

## 2019-06-11 ENCOUNTER — TELEPHONE (OUTPATIENT)
Dept: ORTHOPEDIC SURGERY | Age: 49
End: 2019-06-11

## 2019-07-02 ENCOUNTER — OFFICE VISIT (OUTPATIENT)
Dept: ORTHOPEDIC SURGERY | Age: 49
End: 2019-07-02
Payer: COMMERCIAL

## 2019-07-02 VITALS
SYSTOLIC BLOOD PRESSURE: 92 MMHG | WEIGHT: 205.03 LBS | RESPIRATION RATE: 17 BRPM | BODY MASS INDEX: 29.35 KG/M2 | HEIGHT: 70 IN | HEART RATE: 61 BPM | DIASTOLIC BLOOD PRESSURE: 63 MMHG

## 2019-07-02 DIAGNOSIS — S46.211D RUPTURE OF RIGHT PROXIMAL BICEPS TENDON, SUBSEQUENT ENCOUNTER: Primary | ICD-10-CM

## 2019-07-02 DIAGNOSIS — S43.431D SUPERIOR GLENOID LABRUM LESION OF RIGHT SHOULDER, SUBSEQUENT ENCOUNTER: ICD-10-CM

## 2019-07-02 PROCEDURE — 99214 OFFICE O/P EST MOD 30 MIN: CPT | Performed by: ORTHOPAEDIC SURGERY

## 2019-07-12 ENCOUNTER — TELEPHONE (OUTPATIENT)
Dept: ORTHOPEDIC SURGERY | Age: 49
End: 2019-07-12

## 2019-07-26 ENCOUNTER — TELEPHONE (OUTPATIENT)
Dept: ORTHOPEDIC SURGERY | Age: 49
End: 2019-07-26

## 2019-08-28 ENCOUNTER — ANESTHESIA EVENT (OUTPATIENT)
Dept: OPERATING ROOM | Age: 49
End: 2019-08-28
Payer: COMMERCIAL

## 2019-08-30 ENCOUNTER — ANESTHESIA (OUTPATIENT)
Dept: OPERATING ROOM | Age: 49
End: 2019-08-30
Payer: COMMERCIAL

## 2019-08-30 ENCOUNTER — HOSPITAL ENCOUNTER (OUTPATIENT)
Age: 49
Setting detail: OUTPATIENT SURGERY
Discharge: HOME | End: 2019-08-30
Attending: ORTHOPAEDIC SURGERY | Admitting: ORTHOPAEDIC SURGERY
Payer: COMMERCIAL

## 2019-08-30 ENCOUNTER — TELEPHONE (OUTPATIENT)
Dept: ORTHOPEDIC SURGERY | Age: 49
End: 2019-08-30

## 2019-08-30 VITALS
WEIGHT: 198.38 LBS | SYSTOLIC BLOOD PRESSURE: 121 MMHG | OXYGEN SATURATION: 97 % | BODY MASS INDEX: 28.4 KG/M2 | RESPIRATION RATE: 18 BRPM | HEART RATE: 78 BPM | DIASTOLIC BLOOD PRESSURE: 76 MMHG | TEMPERATURE: 96.9 F | HEIGHT: 70 IN

## 2019-08-30 VITALS
SYSTOLIC BLOOD PRESSURE: 116 MMHG | RESPIRATION RATE: 16 BRPM | DIASTOLIC BLOOD PRESSURE: 76 MMHG | OXYGEN SATURATION: 100 % | TEMPERATURE: 94.8 F

## 2019-08-30 DIAGNOSIS — S46.211D RUPTURE OF PROXIMAL BICEPS TENDON, RIGHT, SUBSEQUENT ENCOUNTER: ICD-10-CM

## 2019-08-30 DIAGNOSIS — S46.211A RUPTURE OF RIGHT PROXIMAL BICEPS TENDON, INITIAL ENCOUNTER: Primary | ICD-10-CM

## 2019-08-30 LAB
BASOPHILS ABSOLUTE: 0 K/UL (ref 0–0.2)
BASOPHILS RELATIVE PERCENT: 0.9 %
C-REACTIVE PROTEIN: 0.7 MG/L (ref 0–5.1)
EOSINOPHILS ABSOLUTE: 0.1 K/UL (ref 0–0.6)
EOSINOPHILS RELATIVE PERCENT: 3.5 %
GLUCOSE BLD-MCNC: 114 MG/DL (ref 70–99)
GLUCOSE BLD-MCNC: 149 MG/DL (ref 70–99)
HCT VFR BLD CALC: 44.2 % (ref 40.5–52.5)
HEMOGLOBIN: 15 G/DL (ref 13.5–17.5)
LYMPHOCYTES ABSOLUTE: 1.8 K/UL (ref 1–5.1)
LYMPHOCYTES RELATIVE PERCENT: 46 %
MCH RBC QN AUTO: 30.1 PG (ref 26–34)
MCHC RBC AUTO-ENTMCNC: 34 G/DL (ref 31–36)
MCV RBC AUTO: 88.7 FL (ref 80–100)
MONOCYTES ABSOLUTE: 0.3 K/UL (ref 0–1.3)
MONOCYTES RELATIVE PERCENT: 8.8 %
NEUTROPHILS ABSOLUTE: 1.6 K/UL (ref 1.7–7.7)
NEUTROPHILS RELATIVE PERCENT: 40.8 %
PDW BLD-RTO: 13.6 % (ref 12.4–15.4)
PERFORMED ON: ABNORMAL
PERFORMED ON: ABNORMAL
PLATELET # BLD: 169 K/UL (ref 135–450)
PMV BLD AUTO: 7.7 FL (ref 5–10.5)
RBC # BLD: 4.98 M/UL (ref 4.2–5.9)
SEDIMENTATION RATE, ERYTHROCYTE: 4 MM/HR (ref 0–20)
WBC # BLD: 3.9 K/UL (ref 4–11)

## 2019-08-30 PROCEDURE — 87075 CULTR BACTERIA EXCEPT BLOOD: CPT

## 2019-08-30 PROCEDURE — 3600000014 HC SURGERY LEVEL 4 ADDTL 15MIN: Performed by: ORTHOPAEDIC SURGERY

## 2019-08-30 PROCEDURE — 6360000002 HC RX W HCPCS

## 2019-08-30 PROCEDURE — 86140 C-REACTIVE PROTEIN: CPT

## 2019-08-30 PROCEDURE — 23405 INCISION OF TENDON & MUSCLE: CPT | Performed by: ORTHOPAEDIC SURGERY

## 2019-08-30 PROCEDURE — 87070 CULTURE OTHR SPECIMN AEROBIC: CPT

## 2019-08-30 PROCEDURE — 2500000003 HC RX 250 WO HCPCS

## 2019-08-30 PROCEDURE — 87205 SMEAR GRAM STAIN: CPT

## 2019-08-30 PROCEDURE — 7100000000 HC PACU RECOVERY - FIRST 15 MIN: Performed by: ORTHOPAEDIC SURGERY

## 2019-08-30 PROCEDURE — 6360000002 HC RX W HCPCS: Performed by: ORTHOPAEDIC SURGERY

## 2019-08-30 PROCEDURE — 2709999900 HC NON-CHARGEABLE SUPPLY: Performed by: ORTHOPAEDIC SURGERY

## 2019-08-30 PROCEDURE — 3700000001 HC ADD 15 MINUTES (ANESTHESIA): Performed by: ORTHOPAEDIC SURGERY

## 2019-08-30 PROCEDURE — 7100000011 HC PHASE II RECOVERY - ADDTL 15 MIN: Performed by: ORTHOPAEDIC SURGERY

## 2019-08-30 PROCEDURE — 2580000003 HC RX 258: Performed by: ANESTHESIOLOGY

## 2019-08-30 PROCEDURE — 3600000004 HC SURGERY LEVEL 4 BASE: Performed by: ORTHOPAEDIC SURGERY

## 2019-08-30 PROCEDURE — 85652 RBC SED RATE AUTOMATED: CPT

## 2019-08-30 PROCEDURE — 85025 COMPLETE CBC W/AUTO DIFF WBC: CPT

## 2019-08-30 PROCEDURE — 7100000010 HC PHASE II RECOVERY - FIRST 15 MIN: Performed by: ORTHOPAEDIC SURGERY

## 2019-08-30 PROCEDURE — 3700000000 HC ANESTHESIA ATTENDED CARE: Performed by: ORTHOPAEDIC SURGERY

## 2019-08-30 PROCEDURE — 6370000000 HC RX 637 (ALT 250 FOR IP): Performed by: ANESTHESIOLOGY

## 2019-08-30 PROCEDURE — 7100000001 HC PACU RECOVERY - ADDTL 15 MIN: Performed by: ORTHOPAEDIC SURGERY

## 2019-08-30 RX ORDER — PROPOFOL 10 MG/ML
INJECTION, EMULSION INTRAVENOUS PRN
Status: DISCONTINUED | OUTPATIENT
Start: 2019-08-30 | End: 2019-08-30 | Stop reason: SDUPTHER

## 2019-08-30 RX ORDER — OXYCODONE HYDROCHLORIDE AND ACETAMINOPHEN 5; 325 MG/1; MG/1
1 TABLET ORAL EVERY 4 HOURS PRN
Qty: 40 TABLET | Refills: 0 | Status: SHIPPED | OUTPATIENT
Start: 2019-08-30 | End: 2019-08-30 | Stop reason: CLARIF

## 2019-08-30 RX ORDER — ONDANSETRON 2 MG/ML
4 INJECTION INTRAMUSCULAR; INTRAVENOUS
Status: DISCONTINUED | OUTPATIENT
Start: 2019-08-30 | End: 2019-08-30 | Stop reason: HOSPADM

## 2019-08-30 RX ORDER — OXYCODONE HYDROCHLORIDE 5 MG/1
5 TABLET ORAL PRN
Status: COMPLETED | OUTPATIENT
Start: 2019-08-30 | End: 2019-08-30

## 2019-08-30 RX ORDER — SODIUM CHLORIDE 0.9 % (FLUSH) 0.9 %
10 SYRINGE (ML) INJECTION PRN
Status: DISCONTINUED | OUTPATIENT
Start: 2019-08-30 | End: 2019-08-30 | Stop reason: HOSPADM

## 2019-08-30 RX ORDER — MORPHINE SULFATE 2 MG/ML
2 INJECTION, SOLUTION INTRAMUSCULAR; INTRAVENOUS EVERY 5 MIN PRN
Status: DISCONTINUED | OUTPATIENT
Start: 2019-08-30 | End: 2019-08-30 | Stop reason: HOSPADM

## 2019-08-30 RX ORDER — MIDAZOLAM HYDROCHLORIDE 1 MG/ML
INJECTION INTRAMUSCULAR; INTRAVENOUS PRN
Status: DISCONTINUED | OUTPATIENT
Start: 2019-08-30 | End: 2019-08-30 | Stop reason: SDUPTHER

## 2019-08-30 RX ORDER — ROCURONIUM BROMIDE 10 MG/ML
INJECTION, SOLUTION INTRAVENOUS PRN
Status: DISCONTINUED | OUTPATIENT
Start: 2019-08-30 | End: 2019-08-30 | Stop reason: SDUPTHER

## 2019-08-30 RX ORDER — POVIDONE-IODINE 10 MG/G
OINTMENT TOPICAL
Status: COMPLETED | OUTPATIENT
Start: 2019-08-30 | End: 2019-08-30

## 2019-08-30 RX ORDER — ONDANSETRON 2 MG/ML
INJECTION INTRAMUSCULAR; INTRAVENOUS PRN
Status: DISCONTINUED | OUTPATIENT
Start: 2019-08-30 | End: 2019-08-30 | Stop reason: SDUPTHER

## 2019-08-30 RX ORDER — METOPROLOL TARTRATE 5 MG/5ML
INJECTION INTRAVENOUS PRN
Status: DISCONTINUED | OUTPATIENT
Start: 2019-08-30 | End: 2019-08-30 | Stop reason: SDUPTHER

## 2019-08-30 RX ORDER — OXYCODONE HYDROCHLORIDE 5 MG/1
10 TABLET ORAL PRN
Status: COMPLETED | OUTPATIENT
Start: 2019-08-30 | End: 2019-08-30

## 2019-08-30 RX ORDER — FENTANYL CITRATE 50 UG/ML
INJECTION, SOLUTION INTRAMUSCULAR; INTRAVENOUS PRN
Status: DISCONTINUED | OUTPATIENT
Start: 2019-08-30 | End: 2019-08-30 | Stop reason: SDUPTHER

## 2019-08-30 RX ORDER — SODIUM CHLORIDE 0.9 % (FLUSH) 0.9 %
10 SYRINGE (ML) INJECTION EVERY 12 HOURS SCHEDULED
Status: DISCONTINUED | OUTPATIENT
Start: 2019-08-30 | End: 2019-08-30 | Stop reason: HOSPADM

## 2019-08-30 RX ORDER — FENTANYL CITRATE 50 UG/ML
25 INJECTION, SOLUTION INTRAMUSCULAR; INTRAVENOUS EVERY 5 MIN PRN
Status: DISCONTINUED | OUTPATIENT
Start: 2019-08-30 | End: 2019-08-30 | Stop reason: HOSPADM

## 2019-08-30 RX ORDER — FENTANYL CITRATE 50 UG/ML
50 INJECTION, SOLUTION INTRAMUSCULAR; INTRAVENOUS EVERY 5 MIN PRN
Status: DISCONTINUED | OUTPATIENT
Start: 2019-08-30 | End: 2019-08-30 | Stop reason: HOSPADM

## 2019-08-30 RX ORDER — SODIUM CHLORIDE 9 MG/ML
INJECTION, SOLUTION INTRAVENOUS CONTINUOUS
Status: DISCONTINUED | OUTPATIENT
Start: 2019-08-30 | End: 2019-08-30 | Stop reason: HOSPADM

## 2019-08-30 RX ORDER — LIDOCAINE HYDROCHLORIDE 20 MG/ML
INJECTION, SOLUTION EPIDURAL; INFILTRATION; INTRACAUDAL; PERINEURAL PRN
Status: DISCONTINUED | OUTPATIENT
Start: 2019-08-30 | End: 2019-08-30 | Stop reason: SDUPTHER

## 2019-08-30 RX ORDER — OXYCODONE HYDROCHLORIDE AND ACETAMINOPHEN 5; 325 MG/1; MG/1
1 TABLET ORAL EVERY 4 HOURS PRN
Qty: 40 TABLET | Refills: 0 | Status: SHIPPED | OUTPATIENT
Start: 2019-08-30 | End: 2019-09-06

## 2019-08-30 RX ORDER — MEPERIDINE HYDROCHLORIDE 25 MG/ML
12.5 INJECTION INTRAMUSCULAR; INTRAVENOUS; SUBCUTANEOUS EVERY 5 MIN PRN
Status: DISCONTINUED | OUTPATIENT
Start: 2019-08-30 | End: 2019-08-30 | Stop reason: HOSPADM

## 2019-08-30 RX ORDER — MORPHINE SULFATE 2 MG/ML
1 INJECTION, SOLUTION INTRAMUSCULAR; INTRAVENOUS EVERY 5 MIN PRN
Status: DISCONTINUED | OUTPATIENT
Start: 2019-08-30 | End: 2019-08-30 | Stop reason: HOSPADM

## 2019-08-30 RX ORDER — DEXAMETHASONE SODIUM PHOSPHATE 4 MG/ML
INJECTION, SOLUTION INTRA-ARTICULAR; INTRALESIONAL; INTRAMUSCULAR; INTRAVENOUS; SOFT TISSUE PRN
Status: DISCONTINUED | OUTPATIENT
Start: 2019-08-30 | End: 2019-08-30 | Stop reason: SDUPTHER

## 2019-08-30 RX ORDER — SUCCINYLCHOLINE/SOD CL,ISO/PF 200MG/10ML
SYRINGE (ML) INTRAVENOUS PRN
Status: DISCONTINUED | OUTPATIENT
Start: 2019-08-30 | End: 2019-08-30 | Stop reason: SDUPTHER

## 2019-08-30 RX ADMIN — DEXAMETHASONE SODIUM PHOSPHATE 4 MG: 4 INJECTION, SOLUTION INTRAMUSCULAR; INTRAVENOUS at 07:36

## 2019-08-30 RX ADMIN — OXYCODONE HYDROCHLORIDE 10 MG: 5 TABLET ORAL at 10:37

## 2019-08-30 RX ADMIN — SUGAMMADEX 200 MG: 100 INJECTION, SOLUTION INTRAVENOUS at 08:30

## 2019-08-30 RX ADMIN — Medication 2 G: at 07:25

## 2019-08-30 RX ADMIN — PROPOFOL 200 MG: 10 INJECTION, EMULSION INTRAVENOUS at 07:28

## 2019-08-30 RX ADMIN — Medication 140 MG: at 07:28

## 2019-08-30 RX ADMIN — METOPROLOL TARTRATE 2 MG: 5 INJECTION, SOLUTION INTRAVENOUS at 07:58

## 2019-08-30 RX ADMIN — MIDAZOLAM 2 MG: 1 INJECTION INTRAMUSCULAR; INTRAVENOUS at 07:24

## 2019-08-30 RX ADMIN — SODIUM CHLORIDE: 9 INJECTION, SOLUTION INTRAVENOUS at 06:38

## 2019-08-30 RX ADMIN — ROCURONIUM BROMIDE 5 MG: 10 INJECTION INTRAVENOUS at 07:28

## 2019-08-30 RX ADMIN — SODIUM CHLORIDE: 9 INJECTION, SOLUTION INTRAVENOUS at 08:19

## 2019-08-30 RX ADMIN — ONDANSETRON 4 MG: 2 INJECTION INTRAMUSCULAR; INTRAVENOUS at 08:27

## 2019-08-30 RX ADMIN — LIDOCAINE HYDROCHLORIDE 80 MG: 20 INJECTION, SOLUTION EPIDURAL; INFILTRATION; INTRACAUDAL; PERINEURAL at 07:28

## 2019-08-30 RX ADMIN — ROCURONIUM BROMIDE 20 MG: 10 INJECTION INTRAVENOUS at 07:37

## 2019-08-30 RX ADMIN — METOPROLOL TARTRATE 1 MG: 5 INJECTION, SOLUTION INTRAVENOUS at 08:02

## 2019-08-30 RX ADMIN — FENTANYL CITRATE 100 MCG: 50 INJECTION INTRAMUSCULAR; INTRAVENOUS at 07:24

## 2019-08-30 ASSESSMENT — PAIN DESCRIPTION - LOCATION
LOCATION: SHOULDER

## 2019-08-30 ASSESSMENT — PAIN SCALES - GENERAL
PAINLEVEL_OUTOF10: 9
PAINLEVEL_OUTOF10: 0
PAINLEVEL_OUTOF10: 8
PAINLEVEL_OUTOF10: 9
PAINLEVEL_OUTOF10: 0
PAINLEVEL_OUTOF10: 9
PAINLEVEL_OUTOF10: 0
PAINLEVEL_OUTOF10: 4
PAINLEVEL_OUTOF10: 0
PAINLEVEL_OUTOF10: 0

## 2019-08-30 ASSESSMENT — PULMONARY FUNCTION TESTS
PIF_VALUE: 13
PIF_VALUE: 17
PIF_VALUE: 17
PIF_VALUE: 6
PIF_VALUE: 16
PIF_VALUE: 0
PIF_VALUE: 17
PIF_VALUE: 17
PIF_VALUE: 1
PIF_VALUE: 16
PIF_VALUE: 16
PIF_VALUE: 12
PIF_VALUE: 26
PIF_VALUE: 25
PIF_VALUE: 17
PIF_VALUE: 15
PIF_VALUE: 13
PIF_VALUE: 17
PIF_VALUE: 17
PIF_VALUE: 18
PIF_VALUE: 18
PIF_VALUE: 17
PIF_VALUE: 16
PIF_VALUE: 15
PIF_VALUE: 17
PIF_VALUE: 41
PIF_VALUE: 22
PIF_VALUE: 14
PIF_VALUE: 17
PIF_VALUE: 15
PIF_VALUE: 0
PIF_VALUE: 18
PIF_VALUE: 16
PIF_VALUE: 15
PIF_VALUE: 17
PIF_VALUE: 15
PIF_VALUE: 18
PIF_VALUE: 18
PIF_VALUE: 17
PIF_VALUE: 16
PIF_VALUE: 16
PIF_VALUE: 1
PIF_VALUE: 16
PIF_VALUE: 1
PIF_VALUE: 0
PIF_VALUE: 16
PIF_VALUE: 17
PIF_VALUE: 26
PIF_VALUE: 15
PIF_VALUE: 2
PIF_VALUE: 5
PIF_VALUE: 17
PIF_VALUE: 16
PIF_VALUE: 17
PIF_VALUE: 1
PIF_VALUE: 17
PIF_VALUE: 18
PIF_VALUE: 17
PIF_VALUE: 6
PIF_VALUE: 19
PIF_VALUE: 16
PIF_VALUE: 11
PIF_VALUE: 17
PIF_VALUE: 0
PIF_VALUE: 17
PIF_VALUE: 16
PIF_VALUE: 17
PIF_VALUE: 21
PIF_VALUE: 17

## 2019-08-30 ASSESSMENT — PAIN - FUNCTIONAL ASSESSMENT
PAIN_FUNCTIONAL_ASSESSMENT: PREVENTS OR INTERFERES SOME ACTIVE ACTIVITIES AND ADLS
PAIN_FUNCTIONAL_ASSESSMENT: PREVENTS OR INTERFERES SOME ACTIVE ACTIVITIES AND ADLS
PAIN_FUNCTIONAL_ASSESSMENT: 0-10
PAIN_FUNCTIONAL_ASSESSMENT: PREVENTS OR INTERFERES SOME ACTIVE ACTIVITIES AND ADLS
PAIN_FUNCTIONAL_ASSESSMENT: PREVENTS OR INTERFERES SOME ACTIVE ACTIVITIES AND ADLS

## 2019-08-30 ASSESSMENT — PAIN DESCRIPTION - ORIENTATION
ORIENTATION: RIGHT

## 2019-08-30 ASSESSMENT — PAIN DESCRIPTION - PAIN TYPE
TYPE: SURGICAL PAIN

## 2019-08-30 ASSESSMENT — PAIN DESCRIPTION - FREQUENCY
FREQUENCY: CONTINUOUS

## 2019-08-30 ASSESSMENT — PAIN DESCRIPTION - DESCRIPTORS
DESCRIPTORS: BURNING

## 2019-08-30 ASSESSMENT — PAIN DESCRIPTION - PROGRESSION
CLINICAL_PROGRESSION: NOT CHANGED
CLINICAL_PROGRESSION: GRADUALLY IMPROVING
CLINICAL_PROGRESSION: GRADUALLY IMPROVING
CLINICAL_PROGRESSION: NOT CHANGED

## 2019-08-30 ASSESSMENT — PAIN DESCRIPTION - ONSET
ONSET: ON-GOING

## 2019-08-30 ASSESSMENT — LIFESTYLE VARIABLES: SMOKING_STATUS: 0

## 2019-08-30 ASSESSMENT — ENCOUNTER SYMPTOMS: SHORTNESS OF BREATH: 0

## 2019-08-30 NOTE — PROGRESS NOTES
Received from PACU. Admitted to Phase 2 care. Awake and alert, respirations easy and even. Oriented to room and surroundings. Continues with pain.

## 2019-08-30 NOTE — PROGRESS NOTES
Tolerating being up in chair and oral intake. States he is feeling \"so much better\". Discharge instructions given to patient and family member. Verbalize understanding. Family member plans on coming back to get medication from [facility] after getting patient's insurance card. Right arm remains in sling. Right finger mobile, but arm remains numb after nerve block.

## 2019-08-30 NOTE — OP NOTE
awoken from anesthesia, transferred onto his  hospital cart, and transported to the PACU for recovery. The patient  tolerated this procedure well without any complications. PLAN:  The patient will be recovered in the PACU and then be discharged  home. He was given a prescription for Percocet as well as Phenergan for  nausea and vomiting. He will follow up in the office next week.         Dennys Beaulieu MD    D: 08/30/2019 9:03:15       T: 08/30/2019 13:04:31     [email or web address]  Job#: [id number]     Doc#: [id number]    CC:

## 2019-08-30 NOTE — PROGRESS NOTES
Alert and oriented. Agreeable to procedure. Consent signed by pt.   Electronically signed by Jerry Ramírez RN on 8/30/2019 at 6:25 AM

## 2019-09-03 ENCOUNTER — OFFICE VISIT (OUTPATIENT)
Dept: ORTHOPEDIC SURGERY | Age: 49
End: 2019-09-03

## 2019-09-03 ENCOUNTER — TELEPHONE (OUTPATIENT)
Dept: ORTHOPEDIC SURGERY | Age: 49
End: 2019-09-03

## 2019-09-03 VITALS — WEIGHT: 198.41 LBS | BODY MASS INDEX: 28.41 KG/M2 | RESPIRATION RATE: 16 BRPM | HEIGHT: 70 IN

## 2019-09-03 DIAGNOSIS — S43.431D SUPERIOR GLENOID LABRUM LESION OF RIGHT SHOULDER, SUBSEQUENT ENCOUNTER: ICD-10-CM

## 2019-09-03 DIAGNOSIS — S46.211D RUPTURE OF RIGHT PROXIMAL BICEPS TENDON, SUBSEQUENT ENCOUNTER: Primary | ICD-10-CM

## 2019-09-03 PROCEDURE — 99024 POSTOP FOLLOW-UP VISIT: CPT | Performed by: ORTHOPAEDIC SURGERY

## 2019-09-04 LAB
ANAEROBIC CULTURE: NORMAL
CULTURE SURGICAL: NORMAL
GRAM STAIN RESULT: NORMAL

## 2019-09-09 ENCOUNTER — TELEPHONE (OUTPATIENT)
Dept: ORTHOPEDIC SURGERY | Age: 49
End: 2019-09-09

## 2019-09-09 NOTE — TELEPHONE ENCOUNTER
Called patient. Informed him that he can call them back and inform them it is under Media scanned 7/19/19. Told him to get scheduled for therapy.

## 2019-09-10 ENCOUNTER — TELEPHONE (OUTPATIENT)
Dept: ORTHOPEDIC SURGERY | Age: 49
End: 2019-09-10

## 2019-09-10 NOTE — TELEPHONE ENCOUNTER
Domingo Payan PT called to obtain an extension on patient C9.     Please call Kelsey Macario when obtained:  [phone]

## 2019-09-13 ENCOUNTER — TELEPHONE (OUTPATIENT)
Dept: PHYSICAL THERAPY | Age: 49
End: 2019-09-13

## 2019-09-16 ENCOUNTER — TELEPHONE (OUTPATIENT)
Dept: PHYSICAL THERAPY | Age: 49
End: 2019-09-16

## 2019-09-16 ENCOUNTER — HOSPITAL ENCOUNTER (OUTPATIENT)
Dept: PHYSICAL THERAPY | Age: 49
Setting detail: THERAPIES SERIES
Discharge: HOME | End: 2019-09-16
Payer: COMMERCIAL

## 2019-09-16 PROCEDURE — 97164 PT RE-EVAL EST PLAN CARE: CPT | Performed by: PHYSICAL THERAPIST

## 2019-09-16 PROCEDURE — 97110 THERAPEUTIC EXERCISES: CPT | Performed by: PHYSICAL THERAPIST

## 2019-09-16 PROCEDURE — 97112 NEUROMUSCULAR REEDUCATION: CPT | Performed by: PHYSICAL THERAPIST

## 2019-09-16 NOTE — PLAN OF CARE
[address], [address] [place], [address]  Phone: [phone]   Fax: [phone]          Physical Therapy Certification    Dear Referring Practitioner: Sanaz Morales,    We had the pleasure of evaluating the following patient for physical therapy services at [address] [place] [address]. A summary of our findings can be found in the initial assessment below. This includes our plan of care. If you have any questions or concerns regarding these findings, please do not hesitate to contact me at the office phone number checked above. Thank you for the referral.       Physician Signature:_______________________________Date:__________________  By signing above (or electronic signature), therapists plan is approved by physician      Patient: Lloyd Coehlo   : 1970   MRN: [id number]  Referring Physician: Referring Practitioner: Sanaz Morales      Evaluation Date: 2019      Medical Diagnosis Information:  Diagnosis: I64.295Z (ICD-10-CM) - Superior glenoid labrum lesion of right shoulder, subsequent encounter S46.211D (ICD-10-CM) - Rupture of right proximal biceps tendon, subsequent encounter   Treatment Diagnosis: M25.511 Pain in Right shoulder     Onset: May 2017                                      Precautions/ Contra-indications: Type 2 diabetes  Latex Allergy:  [x]NO      []YES  Preferred Language for Healthcare:   [x]English       []other:    SUBJECTIVE: Patient stated complaint: R shoulder weakness. In May of 2017, the patient was lifting a 50 lb bucket and turning it over. Pt states it popped. He has not worked since 2017. He had surgery in 2018 to repair his rotator cuff. About a month into physical therapy, his biceps tendon ruptured. The patient states the physical therapist may have stretched him too much for this to occur. He was at the [facility] office on [address] when this happened. The bicep tendon was repaired. reflexes/sensation/myotomal/dermatomal deficits   []Decreased RC/scapular/core strength and neuromuscular control   []other:      Functional Activity Limitations (from functional questionnaire and intake)   []Reduced ability to tolerate prolonged functional positions   []Reduced ability or difficulty with changes of positions or transfers between positions   [x]Reduced ability to maintain good posture and demonstrate good body mechanics with sitting, bending, and lifting   [x] Reduced ability or tolerance with driving and/or computer work   [x]Reduced ability to sleep   [x]Reduced ability to perform lifting, reaching, carrying tasks   [x]Reduced ability to tolerate impact through UE   [x]Reduced ability to reach behind back   [x]Reduced ability to  or hold objects   [x]Reduced ability to throw or toss an object   []other:     Participation Restrictions   [x]Reduced participation in self care activities   [x]Reduced participation in home management activities   [x]Reduced participation in work activities   [x]Reduced participation in social activities. []Reduced participation in sport/recreation activities. Classification:   []Signs/symptoms consistent with post-surgical status including decreased ROM, strength and function.   []Signs/symptoms consistent with joint sprain/strain   []Signs/symptoms consistent with shoulder impingement   []Signs/symptoms consistent with shoulder/elbow/wrist tendinopathy   []Signs/symptoms consistent with Rotator cuff tear   []Signs/symptoms consistent with labral tear   []Signs/symptoms consistent with postural dysfunction    []Signs/symptoms consistent with Glenohumeral IR Deficit - <45 degrees   []Signs/symptoms consistent with facet dysfunction of cervical/thoracic spine    []Signs/symptoms consistent with pathology which may benefit from Dry needling     [x]other: Pt is a 47 y/o male presenting with diagnosis of Superior glenoid labrum lesion of right shoulder, subsequent 31239 (typically 30 minutes face-to-face)  [] EVAL (HIGH) 66753 (typically 45 minutes face-to-face)  [x] Louie Chavez [id number]    Electronically signed by: Shane Colindres, Student Physical Therapist  Therapist was present, directed the patient's care, made skilled judgement, and was responsible for assessment and treatment of the patient.      Antonia Dang, DPT [id number]

## 2019-09-18 ENCOUNTER — HOSPITAL ENCOUNTER (OUTPATIENT)
Dept: PHYSICAL THERAPY | Age: 49
Setting detail: THERAPIES SERIES
Discharge: HOME | End: 2019-09-18
Payer: COMMERCIAL

## 2019-09-18 NOTE — FLOWSHEET NOTE
[address]    Physical Therapy Phone: [phone]    Fax: [phone]  _________________________________________________________________    Physical Therapy  Cancellation/No-show Note  Patient Name:  Annabel Garrison  :  1970   Date:  2019  Cancelled visits to date: 1  No-shows to date: 0    For today's appointment patient:  [x]  Cancelled  []  Rescheduled appointment  []  No-show     Reason given by patient:  []  Patient ill  []  Conflicting appointment  []  No transportation    []  Conflict with work  []  No reason given  []  Other:     Comments:  Pt's car won't start. He is waiting for a new battery to be delivered.       Electronically signed by:  Agapito Clark

## 2019-09-23 ENCOUNTER — HOSPITAL ENCOUNTER (OUTPATIENT)
Dept: PHYSICAL THERAPY | Age: 49
Setting detail: THERAPIES SERIES
Discharge: HOME | End: 2019-09-23
Payer: COMMERCIAL

## 2019-09-23 PROCEDURE — 97140 MANUAL THERAPY 1/> REGIONS: CPT | Performed by: PHYSICAL THERAPIST

## 2019-09-23 PROCEDURE — 97110 THERAPEUTIC EXERCISES: CPT | Performed by: PHYSICAL THERAPIST

## 2019-09-23 PROCEDURE — 97112 NEUROMUSCULAR REEDUCATION: CPT | Performed by: PHYSICAL THERAPIST

## 2019-09-23 NOTE — FLOWSHEET NOTE
Orthopaedics and Sports Rehabilitation, [facility] DR TOMAS MORAN      Physical Therapy Daily Treatment Note  Date:  2019    Patient Name:  Lul Hollins    :  1970  MRN: [id number]  Medical/Treatment Diagnosis Information:  · Diagnosis: S43.431D (ICD-10-CM) - Superior glenoid labrum lesion of right shoulder, subsequent encounter S46.211D (ICD-10-CM) - Rupture of right proximal biceps tendon, subsequent encounter. Onset: May 2017. Recent surgery date of 19   · Treatment Diagnosis: M25.511 Pain in Right shoulder  Insurance/Certification information:  PT Insurance Information: [facility]  Physician Information:  Referring Practitioner: Mere Narayanan of care signed (Y/N):     Date of Patient follow up with Physician: 10/7/2019    Functional Scale:  2019  UEFI: 28.75%    Progress Note: [] Yes  [x] No  Next due by: Visit #10 or 14 Oct 2019      Latex Allergy:  [x] NO      [] YES  Preferred Language for Healthcare:   [x] English       [] other:    Visit # Insurance Allowable   2 for this POC  (19 total)  12 visits or until 19     Pain level:  6/10     SUBJECTIVE:  Patient states it feels like a reed crank and that it feels like there is concrete in his shoulder.     OBJECTIVE: 2019   Observation:    Test measurements:      ROM PROM AROM  Comment    L R L R    Flexion    84    Abduction    52    ER    38    IR    35    Other        Other             Strength L R Comment   Flexion      Abduction      ER      IR      Supraspinatus      Upper Trap      Lower Trap      Mid Trap      Rhomboids      Biceps      Triceps      Horizontal Abduction      Horizontal Adduction      Lats          RESTRICTIONS/PRECAUTIONS: Type 2 Diabetes    Exercises/Interventions:   Exercise/Equipment Resistance/Repetitions Other comments   Aerobic Conditioning     Aerodyne          Stretching/PROM     Wand 10x:10 Supine flexion   Table Slides 10x:10 Flexion and scaption   Wall slides  10x:10  flexion   UE [place]     Pulleys 10x:10 Flexion and scaption   Pendulum     BB IR 10x:10    SL IR     Pec doorway stretch     CBA stretch     UT stretch     LS stretch     Seated ER cane 10x:10    Supine cane ER 10x:10  at 45 degrees abduction    Standing sleeper stretch 10x:10         Isometrics     Retraction          Weight shift     Flexion     Abduction     External Rotation     Internal Rotation     Biceps     Triceps          PRE's     Flexion     Abduction     External Rotation     Internal Rotation     Shrugs     EXT     Reverse Flys     Serratus     Horizontal Abd with ER     Biceps     Triceps     Retraction     [place] Deltoid Program 5'     Cable Column/Theraband     External Rotation     Internal Rotation     Shrugs     Lats     Ext     Flex     Scapular Retraction     BIC     TRIC     PNF          Dynamic Stability          Plyoback     Manual 8' Distraction and PROM flexion, scaption, ER, IR           Therapeutic Exercise and NMR EXR  [x] (56204) Provided verbal/tactile cueing for activities related to strengthening, flexibility, endurance, ROM for improvements in LE, proximal hip, and core control with self care, mobility, lifting, ambulation.  [] (28615) Provided verbal/tactile cueing for activities related to improving balance, coordination, kinesthetic sense, posture, motor skill, proprioception  to assist with LE, proximal hip, and core control in self care, mobility, lifting, ambulation and eccentric single leg control.      NMR and Therapeutic Activities:    [x] (87971 or 25859) Provided verbal/tactile cueing for activities related to improving balance, coordination, kinesthetic sense, posture, motor skill, proprioception and motor activation to allow for proper function of core, proximal hip and LE with self care and ADLs  [] (13092) Gait Re-education- Provided training and instruction to the patient for proper LE, core and proximal hip recruitment and positioning and eccentric body weight control with ambulation re-education

## 2019-09-25 ENCOUNTER — HOSPITAL ENCOUNTER (OUTPATIENT)
Dept: PHYSICAL THERAPY | Age: 49
Setting detail: THERAPIES SERIES
Discharge: HOME | End: 2019-09-25
Payer: COMMERCIAL

## 2019-09-25 PROCEDURE — 97112 NEUROMUSCULAR REEDUCATION: CPT | Performed by: PHYSICAL THERAPIST

## 2019-09-25 PROCEDURE — 97110 THERAPEUTIC EXERCISES: CPT | Performed by: PHYSICAL THERAPIST

## 2019-09-25 PROCEDURE — 97140 MANUAL THERAPY 1/> REGIONS: CPT | Performed by: PHYSICAL THERAPIST

## 2019-09-25 NOTE — FLOWSHEET NOTE
Poor    Patient Requires Follow-up: [x] Yes  [] No    PLAN: If pt does not return, this note can be considered a D/C note. [x] Continue per plan of care [] Alter current plan (see comments)  [] Plan of care initiated [] Hold pending MD visit [] Discharge    Electronically signed by: Maral Diego, Student Physical Therapist  Therapist was present, directed the patient's care, made skilled judgement, and was responsible for assessment and treatment of the patient.     Luis A Robison, DPT [id number]

## 2019-09-30 ENCOUNTER — HOSPITAL ENCOUNTER (OUTPATIENT)
Dept: PHYSICAL THERAPY | Age: 49
Setting detail: THERAPIES SERIES
Discharge: HOME | End: 2019-09-30
Payer: COMMERCIAL

## 2019-09-30 PROCEDURE — 97140 MANUAL THERAPY 1/> REGIONS: CPT | Performed by: PHYSICAL THERAPIST

## 2019-09-30 PROCEDURE — 97110 THERAPEUTIC EXERCISES: CPT | Performed by: PHYSICAL THERAPIST

## 2019-09-30 PROCEDURE — 97530 THERAPEUTIC ACTIVITIES: CPT | Performed by: PHYSICAL THERAPIST

## 2019-10-02 ENCOUNTER — HOSPITAL ENCOUNTER (OUTPATIENT)
Dept: PHYSICAL THERAPY | Age: 49
Setting detail: THERAPIES SERIES
Discharge: HOME | End: 2019-10-02
Payer: COMMERCIAL

## 2019-10-02 PROCEDURE — 97140 MANUAL THERAPY 1/> REGIONS: CPT | Performed by: PHYSICAL THERAPIST

## 2019-10-02 PROCEDURE — 97530 THERAPEUTIC ACTIVITIES: CPT | Performed by: PHYSICAL THERAPIST

## 2019-10-02 PROCEDURE — 97110 THERAPEUTIC EXERCISES: CPT | Performed by: PHYSICAL THERAPIST

## 2019-10-07 ENCOUNTER — HOSPITAL ENCOUNTER (OUTPATIENT)
Dept: PHYSICAL THERAPY | Age: 49
Setting detail: THERAPIES SERIES
Discharge: HOME | End: 2019-10-07
Payer: COMMERCIAL

## 2019-10-07 ENCOUNTER — OFFICE VISIT (OUTPATIENT)
Dept: ORTHOPEDIC SURGERY | Age: 49
End: 2019-10-07

## 2019-10-07 VITALS — WEIGHT: 198.41 LBS | HEIGHT: 70 IN | BODY MASS INDEX: 28.41 KG/M2 | HEART RATE: 70 BPM | RESPIRATION RATE: 17 BRPM

## 2019-10-07 DIAGNOSIS — S46.211D RUPTURE OF RIGHT PROXIMAL BICEPS TENDON, SUBSEQUENT ENCOUNTER: Primary | ICD-10-CM

## 2019-10-07 PROCEDURE — 97140 MANUAL THERAPY 1/> REGIONS: CPT | Performed by: PHYSICAL THERAPIST

## 2019-10-07 PROCEDURE — 99024 POSTOP FOLLOW-UP VISIT: CPT | Performed by: ORTHOPAEDIC SURGERY

## 2019-10-07 PROCEDURE — 97530 THERAPEUTIC ACTIVITIES: CPT | Performed by: PHYSICAL THERAPIST

## 2019-10-07 PROCEDURE — 97110 THERAPEUTIC EXERCISES: CPT | Performed by: PHYSICAL THERAPIST

## 2019-10-09 ENCOUNTER — HOSPITAL ENCOUNTER (OUTPATIENT)
Dept: PHYSICAL THERAPY | Age: 49
Setting detail: THERAPIES SERIES
Discharge: HOME | End: 2019-10-09
Payer: COMMERCIAL

## 2019-10-09 PROCEDURE — 97530 THERAPEUTIC ACTIVITIES: CPT | Performed by: PHYSICAL THERAPIST

## 2019-10-09 PROCEDURE — 97140 MANUAL THERAPY 1/> REGIONS: CPT | Performed by: PHYSICAL THERAPIST

## 2019-10-09 PROCEDURE — 97110 THERAPEUTIC EXERCISES: CPT | Performed by: PHYSICAL THERAPIST

## 2019-10-14 ENCOUNTER — HOSPITAL ENCOUNTER (OUTPATIENT)
Dept: PHYSICAL THERAPY | Age: 49
Setting detail: THERAPIES SERIES
Discharge: HOME | End: 2019-10-14
Payer: COMMERCIAL

## 2019-10-14 ENCOUNTER — TELEPHONE (OUTPATIENT)
Dept: ORTHOPEDIC SURGERY | Age: 49
End: 2019-10-14

## 2019-10-14 PROCEDURE — 97140 MANUAL THERAPY 1/> REGIONS: CPT | Performed by: PHYSICAL THERAPIST

## 2019-10-14 PROCEDURE — 97110 THERAPEUTIC EXERCISES: CPT | Performed by: PHYSICAL THERAPIST

## 2019-10-14 PROCEDURE — 97530 THERAPEUTIC ACTIVITIES: CPT | Performed by: PHYSICAL THERAPIST

## 2019-10-16 ENCOUNTER — HOSPITAL ENCOUNTER (OUTPATIENT)
Dept: PHYSICAL THERAPY | Age: 49
Setting detail: THERAPIES SERIES
Discharge: HOME | End: 2019-10-16
Payer: COMMERCIAL

## 2019-10-16 PROCEDURE — 97110 THERAPEUTIC EXERCISES: CPT | Performed by: PHYSICAL THERAPIST

## 2019-10-16 PROCEDURE — 97140 MANUAL THERAPY 1/> REGIONS: CPT | Performed by: PHYSICAL THERAPIST

## 2019-10-16 PROCEDURE — 97530 THERAPEUTIC ACTIVITIES: CPT | Performed by: PHYSICAL THERAPIST

## 2019-10-21 ENCOUNTER — HOSPITAL ENCOUNTER (OUTPATIENT)
Dept: PHYSICAL THERAPY | Age: 49
Setting detail: THERAPIES SERIES
Discharge: HOME | End: 2019-10-21
Payer: COMMERCIAL

## 2019-10-21 PROCEDURE — 97140 MANUAL THERAPY 1/> REGIONS: CPT | Performed by: PHYSICAL THERAPIST

## 2019-10-21 PROCEDURE — 97530 THERAPEUTIC ACTIVITIES: CPT | Performed by: PHYSICAL THERAPIST

## 2019-10-21 PROCEDURE — 97110 THERAPEUTIC EXERCISES: CPT | Performed by: PHYSICAL THERAPIST

## 2019-10-23 ENCOUNTER — HOSPITAL ENCOUNTER (OUTPATIENT)
Dept: PHYSICAL THERAPY | Age: 49
Setting detail: THERAPIES SERIES
Discharge: HOME | End: 2019-10-23
Payer: COMMERCIAL

## 2019-10-23 PROCEDURE — 97110 THERAPEUTIC EXERCISES: CPT | Performed by: PHYSICAL THERAPIST

## 2019-10-23 PROCEDURE — 97530 THERAPEUTIC ACTIVITIES: CPT | Performed by: PHYSICAL THERAPIST

## 2019-10-23 PROCEDURE — 97140 MANUAL THERAPY 1/> REGIONS: CPT | Performed by: PHYSICAL THERAPIST

## 2019-10-30 ENCOUNTER — HOSPITAL ENCOUNTER (OUTPATIENT)
Dept: PHYSICAL THERAPY | Age: 49
Setting detail: THERAPIES SERIES
Discharge: HOME | End: 2019-10-30
Payer: COMMERCIAL

## 2019-10-30 PROCEDURE — 97140 MANUAL THERAPY 1/> REGIONS: CPT | Performed by: PHYSICAL THERAPIST

## 2019-10-30 PROCEDURE — 97530 THERAPEUTIC ACTIVITIES: CPT | Performed by: PHYSICAL THERAPIST

## 2019-10-30 PROCEDURE — 97110 THERAPEUTIC EXERCISES: CPT | Performed by: PHYSICAL THERAPIST

## 2019-10-31 ENCOUNTER — TELEPHONE (OUTPATIENT)
Dept: PHYSICAL THERAPY | Age: 49
End: 2019-10-31

## 2019-10-31 ENCOUNTER — TELEPHONE (OUTPATIENT)
Dept: ORTHOPEDIC SURGERY | Age: 49
End: 2019-10-31

## 2019-11-11 ENCOUNTER — HOSPITAL ENCOUNTER (OUTPATIENT)
Dept: PHYSICAL THERAPY | Age: 49
Setting detail: THERAPIES SERIES
Discharge: HOME | End: 2019-11-11
Payer: COMMERCIAL

## 2019-11-11 PROCEDURE — 97530 THERAPEUTIC ACTIVITIES: CPT | Performed by: PHYSICAL THERAPIST

## 2019-11-11 PROCEDURE — 97140 MANUAL THERAPY 1/> REGIONS: CPT | Performed by: PHYSICAL THERAPIST

## 2019-11-11 PROCEDURE — 97110 THERAPEUTIC EXERCISES: CPT | Performed by: PHYSICAL THERAPIST

## 2019-11-13 ENCOUNTER — HOSPITAL ENCOUNTER (OUTPATIENT)
Dept: PHYSICAL THERAPY | Age: 49
Setting detail: THERAPIES SERIES
Discharge: HOME | End: 2019-11-13
Payer: COMMERCIAL

## 2019-11-13 PROCEDURE — 97140 MANUAL THERAPY 1/> REGIONS: CPT | Performed by: PHYSICAL THERAPIST

## 2019-11-13 PROCEDURE — 97110 THERAPEUTIC EXERCISES: CPT | Performed by: PHYSICAL THERAPIST

## 2019-11-13 PROCEDURE — 97530 THERAPEUTIC ACTIVITIES: CPT | Performed by: PHYSICAL THERAPIST

## 2019-11-18 ENCOUNTER — HOSPITAL ENCOUNTER (OUTPATIENT)
Dept: PHYSICAL THERAPY | Age: 49
Setting detail: THERAPIES SERIES
Discharge: HOME | End: 2019-11-18
Payer: COMMERCIAL

## 2019-11-18 PROCEDURE — 97530 THERAPEUTIC ACTIVITIES: CPT | Performed by: PHYSICAL THERAPIST

## 2019-11-18 PROCEDURE — 97110 THERAPEUTIC EXERCISES: CPT | Performed by: PHYSICAL THERAPIST

## 2019-11-20 ENCOUNTER — HOSPITAL ENCOUNTER (OUTPATIENT)
Dept: PHYSICAL THERAPY | Age: 49
Setting detail: THERAPIES SERIES
Discharge: HOME | End: 2019-11-20
Payer: COMMERCIAL

## 2019-11-20 PROCEDURE — 97110 THERAPEUTIC EXERCISES: CPT | Performed by: PHYSICAL THERAPIST

## 2019-11-20 PROCEDURE — 97530 THERAPEUTIC ACTIVITIES: CPT | Performed by: PHYSICAL THERAPIST

## 2019-11-25 ENCOUNTER — HOSPITAL ENCOUNTER (OUTPATIENT)
Dept: PHYSICAL THERAPY | Age: 49
Setting detail: THERAPIES SERIES
Discharge: HOME | End: 2019-11-25
Payer: COMMERCIAL

## 2019-11-25 PROCEDURE — 97530 THERAPEUTIC ACTIVITIES: CPT | Performed by: PHYSICAL THERAPIST

## 2019-11-25 PROCEDURE — 97110 THERAPEUTIC EXERCISES: CPT | Performed by: PHYSICAL THERAPIST

## 2019-11-27 ENCOUNTER — HOSPITAL ENCOUNTER (OUTPATIENT)
Dept: PHYSICAL THERAPY | Age: 49
Setting detail: THERAPIES SERIES
Discharge: HOME | End: 2019-11-27
Payer: COMMERCIAL

## 2019-11-27 PROCEDURE — 97110 THERAPEUTIC EXERCISES: CPT | Performed by: PHYSICAL THERAPIST

## 2019-11-27 PROCEDURE — 97530 THERAPEUTIC ACTIVITIES: CPT | Performed by: PHYSICAL THERAPIST

## 2019-11-27 PROCEDURE — 97112 NEUROMUSCULAR REEDUCATION: CPT | Performed by: PHYSICAL THERAPIST

## 2019-12-02 ENCOUNTER — HOSPITAL ENCOUNTER (OUTPATIENT)
Dept: PHYSICAL THERAPY | Age: 49
Setting detail: THERAPIES SERIES
Discharge: HOME | End: 2019-12-02
Payer: COMMERCIAL

## 2019-12-02 PROCEDURE — 97112 NEUROMUSCULAR REEDUCATION: CPT | Performed by: PHYSICAL THERAPIST

## 2019-12-02 PROCEDURE — 97530 THERAPEUTIC ACTIVITIES: CPT | Performed by: PHYSICAL THERAPIST

## 2019-12-02 PROCEDURE — 97110 THERAPEUTIC EXERCISES: CPT | Performed by: PHYSICAL THERAPIST

## 2019-12-04 ENCOUNTER — HOSPITAL ENCOUNTER (OUTPATIENT)
Dept: PHYSICAL THERAPY | Age: 49
Setting detail: THERAPIES SERIES
Discharge: HOME | End: 2019-12-04
Payer: COMMERCIAL

## 2019-12-04 PROCEDURE — 97112 NEUROMUSCULAR REEDUCATION: CPT | Performed by: PHYSICAL THERAPIST

## 2019-12-04 PROCEDURE — 97110 THERAPEUTIC EXERCISES: CPT | Performed by: PHYSICAL THERAPIST

## 2019-12-04 PROCEDURE — 97530 THERAPEUTIC ACTIVITIES: CPT | Performed by: PHYSICAL THERAPIST

## 2019-12-09 ENCOUNTER — HOSPITAL ENCOUNTER (OUTPATIENT)
Dept: PHYSICAL THERAPY | Age: 49
Setting detail: THERAPIES SERIES
Discharge: HOME | End: 2019-12-09
Payer: COMMERCIAL

## 2019-12-09 ENCOUNTER — OFFICE VISIT (OUTPATIENT)
Dept: ORTHOPEDIC SURGERY | Age: 49
End: 2019-12-09
Payer: COMMERCIAL

## 2019-12-09 VITALS
HEART RATE: 69 BPM | HEIGHT: 70 IN | BODY MASS INDEX: 28.41 KG/M2 | DIASTOLIC BLOOD PRESSURE: 89 MMHG | WEIGHT: 198.41 LBS | RESPIRATION RATE: 17 BRPM | SYSTOLIC BLOOD PRESSURE: 136 MMHG

## 2019-12-09 DIAGNOSIS — S46.211D RUPTURE OF RIGHT PROXIMAL BICEPS TENDON, SUBSEQUENT ENCOUNTER: Primary | ICD-10-CM

## 2019-12-09 DIAGNOSIS — S43.431D SUPERIOR GLENOID LABRUM LESION OF RIGHT SHOULDER, SUBSEQUENT ENCOUNTER: ICD-10-CM

## 2019-12-09 PROCEDURE — 99213 OFFICE O/P EST LOW 20 MIN: CPT | Performed by: ORTHOPAEDIC SURGERY

## 2019-12-09 PROCEDURE — 97112 NEUROMUSCULAR REEDUCATION: CPT | Performed by: PHYSICAL THERAPIST

## 2019-12-09 PROCEDURE — 97110 THERAPEUTIC EXERCISES: CPT | Performed by: PHYSICAL THERAPIST

## 2019-12-09 PROCEDURE — 97530 THERAPEUTIC ACTIVITIES: CPT | Performed by: PHYSICAL THERAPIST

## 2019-12-11 ENCOUNTER — HOSPITAL ENCOUNTER (OUTPATIENT)
Dept: PHYSICAL THERAPY | Age: 49
Setting detail: THERAPIES SERIES
Discharge: HOME | End: 2019-12-11
Payer: COMMERCIAL

## 2019-12-11 PROCEDURE — 97110 THERAPEUTIC EXERCISES: CPT | Performed by: PHYSICAL THERAPIST

## 2019-12-11 PROCEDURE — 97530 THERAPEUTIC ACTIVITIES: CPT | Performed by: PHYSICAL THERAPIST

## 2019-12-11 PROCEDURE — 97112 NEUROMUSCULAR REEDUCATION: CPT | Performed by: PHYSICAL THERAPIST

## 2019-12-13 ENCOUNTER — TELEPHONE (OUTPATIENT)
Dept: ORTHOPEDIC SURGERY | Age: 49
End: 2019-12-13

## 2019-12-16 ENCOUNTER — TELEPHONE (OUTPATIENT)
Dept: ORTHOPEDIC SURGERY | Age: 49
End: 2019-12-16

## 2019-12-16 ENCOUNTER — HOSPITAL ENCOUNTER (OUTPATIENT)
Dept: PHYSICAL THERAPY | Age: 49
Setting detail: THERAPIES SERIES
End: 2019-12-16
Payer: COMMERCIAL

## 2019-12-18 ENCOUNTER — APPOINTMENT (OUTPATIENT)
Dept: PHYSICAL THERAPY | Age: 49
End: 2019-12-18
Payer: COMMERCIAL

## 2019-12-18 ENCOUNTER — HOSPITAL ENCOUNTER (OUTPATIENT)
Dept: PHYSICAL THERAPY | Age: 49
Setting detail: THERAPIES SERIES
Discharge: HOME | End: 2019-12-18
Payer: COMMERCIAL

## 2019-12-18 PROCEDURE — 97110 THERAPEUTIC EXERCISES: CPT | Performed by: PHYSICAL THERAPIST

## 2019-12-18 PROCEDURE — 97112 NEUROMUSCULAR REEDUCATION: CPT | Performed by: PHYSICAL THERAPIST

## 2019-12-18 PROCEDURE — 97530 THERAPEUTIC ACTIVITIES: CPT | Performed by: PHYSICAL THERAPIST

## 2019-12-20 ENCOUNTER — HOSPITAL ENCOUNTER (OUTPATIENT)
Dept: PHYSICAL THERAPY | Age: 49
Setting detail: THERAPIES SERIES
Discharge: HOME | End: 2019-12-20
Payer: COMMERCIAL

## 2019-12-20 PROCEDURE — 97530 THERAPEUTIC ACTIVITIES: CPT

## 2019-12-20 PROCEDURE — 97110 THERAPEUTIC EXERCISES: CPT

## 2019-12-20 PROCEDURE — 97112 NEUROMUSCULAR REEDUCATION: CPT

## 2019-12-23 ENCOUNTER — HOSPITAL ENCOUNTER (OUTPATIENT)
Dept: PHYSICAL THERAPY | Age: 49
Setting detail: THERAPIES SERIES
Discharge: HOME | End: 2019-12-23
Payer: COMMERCIAL

## 2019-12-23 PROCEDURE — 97110 THERAPEUTIC EXERCISES: CPT | Performed by: PHYSICAL THERAPIST

## 2019-12-23 PROCEDURE — 97530 THERAPEUTIC ACTIVITIES: CPT | Performed by: PHYSICAL THERAPIST

## 2019-12-23 PROCEDURE — 97112 NEUROMUSCULAR REEDUCATION: CPT | Performed by: PHYSICAL THERAPIST

## 2019-12-27 ENCOUNTER — HOSPITAL ENCOUNTER (OUTPATIENT)
Dept: PHYSICAL THERAPY | Age: 49
Setting detail: THERAPIES SERIES
Discharge: HOME | End: 2019-12-27
Payer: COMMERCIAL

## 2019-12-27 PROCEDURE — 97110 THERAPEUTIC EXERCISES: CPT

## 2019-12-27 PROCEDURE — 97530 THERAPEUTIC ACTIVITIES: CPT

## 2019-12-27 PROCEDURE — 97112 NEUROMUSCULAR REEDUCATION: CPT

## 2019-12-30 ENCOUNTER — HOSPITAL ENCOUNTER (OUTPATIENT)
Dept: PHYSICAL THERAPY | Age: 49
Setting detail: THERAPIES SERIES
Discharge: HOME | End: 2019-12-30
Payer: COMMERCIAL

## 2019-12-30 ENCOUNTER — APPOINTMENT (OUTPATIENT)
Dept: PHYSICAL THERAPY | Age: 49
End: 2019-12-30
Payer: COMMERCIAL

## 2019-12-30 PROCEDURE — 97530 THERAPEUTIC ACTIVITIES: CPT | Performed by: PHYSICAL THERAPIST

## 2019-12-30 PROCEDURE — 97112 NEUROMUSCULAR REEDUCATION: CPT | Performed by: PHYSICAL THERAPIST

## 2019-12-30 PROCEDURE — 97110 THERAPEUTIC EXERCISES: CPT | Performed by: PHYSICAL THERAPIST

## 2020-01-03 ENCOUNTER — HOSPITAL ENCOUNTER (OUTPATIENT)
Dept: PHYSICAL THERAPY | Age: 50
Setting detail: THERAPIES SERIES
Discharge: HOME | End: 2020-01-03
Payer: COMMERCIAL

## 2020-01-03 PROCEDURE — 97110 THERAPEUTIC EXERCISES: CPT | Performed by: PHYSICAL THERAPIST

## 2020-01-03 PROCEDURE — 97530 THERAPEUTIC ACTIVITIES: CPT | Performed by: PHYSICAL THERAPIST

## 2020-01-03 PROCEDURE — 97112 NEUROMUSCULAR REEDUCATION: CPT | Performed by: PHYSICAL THERAPIST

## 2020-01-03 NOTE — FLOWSHEET NOTE
ambulation.  [] (43548) Provided verbal/tactile cueing for activities related to improving balance, coordination, kinesthetic sense, posture, motor skill, proprioception  to assist with LE, proximal hip, and core control in self care, mobility, lifting, ambulation and eccentric single leg control. NMR and Therapeutic Activities:    [x] (27124 or 62466) Provided verbal/tactile cueing for activities related to improving balance, coordination, kinesthetic sense, posture, motor skill, proprioception and motor activation to allow for proper function of core, proximal hip and LE with self care and ADLs  [] (93496) Gait Re-education- Provided training and instruction to the patient for proper LE, core and proximal hip recruitment and positioning and eccentric body weight control with ambulation re-education including up and down stairs     Home Exercise Program:    [x] (56193) Reviewed/Progressed HEP activities related to strengthening, flexibility, endurance, ROM of core, proximal hip and LE for functional self-care, mobility, lifting and ambulation/stair navigation   [] (46211)Reviewed/Progressed HEP activities related to improving balance, coordination, kinesthetic sense, posture, motor skill, proprioception of core, proximal hip and LE for self care, mobility, lifting, and ambulation/stair navigation      Manual Treatments:  PROM / STM / Oscillations-Mobs:  G-I, II, III, IV (PA's, Inf., Post.)  [x] (87982) Provided manual therapy to mobilize LE, proximal hip and/or LS spine soft tissue/joints for the purpose of modulating pain, promoting relaxation,  increasing ROM, reducing/eliminating soft tissue swelling/inflammation/restriction, improving soft tissue extensibility and allowing for proper ROM for normal function with self care, mobility, lifting and ambulation.      Modalities:   declined      Charges: [id number]  Timed Code Treatment Minutes: 50   Total Treatment Minutes: 50      [] EVAL (LOW) 42968   [] EVAL (MOD) 32568   [] EVAL (HIGH) 23785       [] RE-EVAL   [x] UV(87971) x   1  (328-719- 22) [] IONTO  [x] NMR (95720) x  1 (208-220- 12')    [] VASO  [] Manual (44964) x    [] Other:  [x] TA x  1 (130-146- 16')    [] Mech Traction (42136)  [] ES(attended) (74617)      [] ES (un) (57905):       GOALS:   Patient stated goal: to have full strength and full work duty. To be able to throw.      Therapist goals for Patient:   Short Term Goals: To be achieved in: 2 weeks  1. Independent in HEP and progression per patient tolerance, in order to prevent re-injury. -ongoing  2. Patient will have a decrease in pain from 9/10 at worst to 5/10  to facilitate improvement in movement, function, and ADLs as indicated by Functional Deficits.-ongoing (7-8/10 at night if bad weather, at night, or after PT.)     Long Term Goals: To be achieved in: 12 weeks  1. Disability index score of 90% or better for the UEFI to assist with reaching prior level of function. -ongoing    2. Patient will demonstrate increased AROM to shoulder flex greater than or equal to 180, ABD greater than or equal to 180, ER greater than or equal to 90, IR greater than or equal to 70 to allow for proper joint functioning as indicated by patients Functional Deficits. -ongoing  3. Patient will demonstrate an increase in strength to shoulder flex greater than or equal to 4+, ABD greater than or equal to 4+, ER greater than or equal to 4+, IR greater than or equal to 4+ to allow for proper functional mobility as indicated by patients Functional Deficits. -ongoing  4. Patient will return to throwing without increased symptoms or restriction. -ongoing  5. Patient will return to work without pain.  -ongoing     Progression Towards Functional goals:  [x] Patient is progressing as expected towards functional goals listed. [] Progression is slowed due to complexities listed. [] Progression has been slowed due to co-morbidities.   [] Plan just implemented, too soon to assess goals progression  [] Other:     ASSESSMENT:      Treatment/Activity Tolerance:  [] Patient tolerated treatment well [x] Patient limited by fatique  [x] Patient limited by pain  [] Patient limited by other medical complications  [x] Other: since pt was more sore in his shoulder today, did not try exercises on cable column today and continued with TB exercises. He did have to decrease range on HARSTAD flex and decrease resistance on several of the weighted strength exercises. He did need cues to correct form on TB IR to avoid trunk rotation. He was educated to ice his shoulder this weekend and take a couple days of rest and will recheck everything in PT next week. He was able to make good bicep muscle and no ecchymosis or effusion noted. Prognosis: [] Good [x] Fair  [] Poor    Patient Requires Follow-up: [x] Yes  [] No    PLAN: If pt does not return, this note can be considered a D/C note.     Patient would benefit from continued care to work on functional progression for work related tasks and continued strengthening, increased cardio and functional upper body exercises, measurements 1x/week, move tband exercises to cable column due to access to gym equipment   [x] Continue per plan of care [] Alter current plan (see comments)  [] Plan of care initiated [] Hold pending MD visit [] Discharge    Electronically signed by:        Teresa Person , PT, DPT [id number]

## 2020-01-13 ENCOUNTER — TELEPHONE (OUTPATIENT)
Dept: ORTHOPEDIC SURGERY | Age: 50
End: 2020-01-13

## 2020-01-17 ENCOUNTER — TELEPHONE (OUTPATIENT)
Dept: ORTHOPEDIC SURGERY | Age: 50
End: 2020-01-17

## 2020-01-21 ENCOUNTER — TELEPHONE (OUTPATIENT)
Dept: PHYSICAL THERAPY | Age: 50
End: 2020-01-21

## 2020-01-21 ENCOUNTER — TELEPHONE (OUTPATIENT)
Dept: ORTHOPEDIC SURGERY | Age: 50
End: 2020-01-21

## 2020-01-21 NOTE — TELEPHONE ENCOUNTER
After speaking with Nena anthony/Ashleigh TILLEY. I informed her that C9 was completed on 1/13/20. After going through patient's chart I was able to find his case  Manager nurse number, Ezra Martin at [phone]. LM that we are still looking for approval of that C9 asap as patient has not been in therapy since 1/3/20. LM to please fax to our office so that I can forward on to therapy. Also called Nena to inform her that this was done.

## 2020-01-21 NOTE — TELEPHONE ENCOUNTER
Called Dr Cassandra Wells office to see if they know what is taking so long to get an approval.  I talked to Michelle and she said she would look into it. Called me back and said she left a message on [employer] () voice mail. Hopefully that will push this along. I called and Left a message for susan (with mother) to call me back.   I need to cancel due to I mistakenly thought the request was the approval.

## 2020-01-21 NOTE — TELEPHONE ENCOUNTER
Anny anthony/ [place] [facility] is calling to speak with Michelle or someone about her PT that has not been approved. She was very upset that we don't have this done yet. She stated it was ridiculous the patient has been waiting for a month. I try to explain that maybe because it is workers comp it might take longer.   No she said it was our fault

## 2020-01-22 ENCOUNTER — TELEPHONE (OUTPATIENT)
Dept: PHYSICAL THERAPY | Age: 50
End: 2020-01-22

## 2020-01-22 ENCOUNTER — HOSPITAL ENCOUNTER (OUTPATIENT)
Dept: PHYSICAL THERAPY | Age: 50
Setting detail: THERAPIES SERIES
End: 2020-01-22
Payer: COMMERCIAL

## 2020-01-22 NOTE — TELEPHONE ENCOUNTER
Patient came into clinic believing he had approval. In error he is not approved yet for a new C9 and we are waiting after a refax of the C9 by the MDs office. We discussed that we unfortunately cannot reach out to the work comp provider as that is the MD who must do so. We also discussed what the patient has been doing in terms of staying active and he mentioned that he has been keeping up with his stretches and ROM. 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 He plans to get a gym membership this coming Friday and we discussed the different exercises he can be doing in the mean time.  We will continue to check on his approval 2-3x/work day and reach out as soon as we have full approval.

## 2020-01-27 ENCOUNTER — TELEPHONE (OUTPATIENT)
Dept: PHYSICAL THERAPY | Age: 50
End: 2020-01-27

## 2020-01-28 ENCOUNTER — TELEPHONE (OUTPATIENT)
Dept: ORTHOPEDIC SURGERY | Age: 50
End: 2020-01-28

## 2020-01-28 ENCOUNTER — TELEPHONE (OUTPATIENT)
Dept: PHYSICAL THERAPY | Age: 50
End: 2020-01-28

## 2020-01-28 NOTE — TELEPHONE ENCOUNTER
Patient called again about his [facility] approval.  Stated he has a letter from [facility] stating they no longer use C9's, haven't received an Floria Sink request for PT. Advised pt that the requests are generated by his referring physician and we cannot submit the requests. He stated he would contact his doctor's office.

## 2020-01-28 NOTE — TELEPHONE ENCOUNTER
Patient has called office inquiring about his C9s not being done properly. I explained that I have done 3 c9s since his last visit requesting therapy. I even called his  that has came into the office and LM that this needs to please be addressed. Also called his  MsSunday René Citizen and informed her that I faxed her a copy of all three C9s and I have received no denial nor approval.  Copy of her request is scanned into [employer].

## 2020-02-03 ENCOUNTER — TELEPHONE (OUTPATIENT)
Dept: ORTHOPEDIC SURGERY | Age: 50
End: 2020-02-03

## 2020-02-05 ENCOUNTER — TELEPHONE (OUTPATIENT)
Dept: ORTHOPEDIC SURGERY | Age: 50
End: 2020-02-05

## 2020-02-05 NOTE — TELEPHONE ENCOUNTER
Dr Morel Burn office called to do a peer to peer for a denial on additional PT   Voicemail stated you can call today by 4:30pm

## 2020-02-10 ENCOUNTER — TELEPHONE (OUTPATIENT)
Dept: ORTHOPEDIC SURGERY | Age: 50
End: 2020-02-10

## 2020-02-10 ENCOUNTER — OFFICE VISIT (OUTPATIENT)
Dept: ORTHOPEDIC SURGERY | Age: 50
End: 2020-02-10
Payer: COMMERCIAL

## 2020-02-10 VITALS
HEART RATE: 82 BPM | DIASTOLIC BLOOD PRESSURE: 70 MMHG | WEIGHT: 204 LBS | SYSTOLIC BLOOD PRESSURE: 114 MMHG | BODY MASS INDEX: 29.2 KG/M2 | HEIGHT: 70 IN

## 2020-02-10 PROCEDURE — 99213 OFFICE O/P EST LOW 20 MIN: CPT | Performed by: ORTHOPAEDIC SURGERY

## 2020-02-11 ENCOUNTER — TELEPHONE (OUTPATIENT)
Dept: ORTHOPEDIC SURGERY | Age: 50
End: 2020-02-11

## 2020-02-14 ENCOUNTER — TELEPHONE (OUTPATIENT)
Dept: ORTHOPEDIC SURGERY | Age: 50
End: 2020-02-14

## 2020-03-02 ENCOUNTER — TELEPHONE (OUTPATIENT)
Dept: ORTHOPEDIC SURGERY | Age: 50
End: 2020-03-02

## 2020-03-25 ENCOUNTER — HOSPITAL ENCOUNTER (OUTPATIENT)
Dept: PHYSICAL THERAPY | Age: 50
Setting detail: THERAPIES SERIES
Discharge: HOME | End: 2020-03-25
Payer: COMMERCIAL

## 2020-03-25 PROCEDURE — 97140 MANUAL THERAPY 1/> REGIONS: CPT | Performed by: PHYSICAL THERAPIST

## 2020-03-25 PROCEDURE — 97164 PT RE-EVAL EST PLAN CARE: CPT | Performed by: PHYSICAL THERAPIST

## 2020-03-25 PROCEDURE — 97110 THERAPEUTIC EXERCISES: CPT | Performed by: PHYSICAL THERAPIST

## 2020-03-25 NOTE — FLOWSHEET NOTE
Shoulder ER 3 10 Black TB, Added 3/25   Shoulder IR 3 10 Black TB, Added 3/25   Shoulder ext 3 10 Black TB, Added 3/25         Serratus 3 10 6#, Added 3/25   Lat pulldowns      shrugs 3 10 6#, Added 3/25   Biceps 3 10 6#, Added 3/25   Triceps  3 10 6#, Added 3/25         Ball on the wall  3 10 Kickball, 4-way, Added 3/25   Shoulder PNF             Therapeutic Activity (96947)                      Therapeutic Exercise and NMR EXR  [x] (35550) Provided verbal/tactile cueing for activities related to strengthening, flexibility, endurance, ROM  for improvements in scapular, scapulothoracic and UE control with self care, reaching, carrying, lifting, house/yardwork, driving/computer work. [x] (36588) Provided verbal/tactile cueing for activities related to improving balance, coordination, kinesthetic sense, posture, motor skill, proprioception  to assist with  scapular, scapulothoracic and UE control with self care, reaching, carrying, lifting, house/yardwork, driving/computer work. Therapeutic Activities:    [x] (98311 or 56002) Provided verbal/tactile cueing for activities related to improving balance, coordination, kinesthetic sense, posture, motor skill, proprioception and motor activation to allow for proper function of scapular, scapulothoracic and UE control with self care, carrying, lifting, driving/computer work.      Home Exercise Program:    [x] (75611) Reviewed/Progressed HEP activities related to strengthening, flexibility, endurance, ROM of scapular, scapulothoracic and UE control with self care, reaching, carrying, lifting, house/yardwork, driving/computer work  [] (39533) Reviewed/Progressed HEP activities related to improving balance, coordination, kinesthetic sense, posture, motor skill, proprioception of scapular, scapulothoracic and UE control with self care, reaching, carrying, lifting, house/yardwork, driving/computer work      Manual Treatments:  PROM / STM / Oscillations-Mobs:  G-I, II, III, IV (Riky, Inf., Post.)  [x] (30518) Provided manual therapy to mobilize soft tissue/joints of cervical/CT, scapular GHJ and UE for the purpose of modulating pain, promoting relaxation,  increasing ROM, reducing/eliminating soft tissue swelling/inflammation/restriction, improving soft tissue extensibility and allowing for proper ROM for normal function with self care, reaching, carrying, lifting, house/yardwork, driving/computer work    Modalities: Ice to go 3/25   [] GAME READY (VASO)- for significant edema, swelling, pain control. 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 Independent in HEP and progression per patient tolerance, in order to prevent re-injury. [] Progressing: [] Met: [] Not Met: [] Adjusted   2. Patient will have a decrease in pain to facilitate improvement in movement, function, and ADLs as indicated by Functional Deficits. [] Progressing: [] Met: [] Not Met: [] Adjusted    Long Term Goals: To be achieved in: 6-8 weeks  1. Disability index score of 35% or less for the UEFI to assist with reaching prior level of function. [] Progressing: [] Met: [] Not Met: [] Adjusted  2.  Patient will demonstrate increased right shoulder flex and ABD AROM to greater than or equal to 180 deg, ER AROM to greater than or equal to 90 deg, and IR AROM to greater than or equal to 65 deg to allow for proper joint functioning as indicated by patients Functional Deficits. [] Progressing: [] Met: [] Not Met: [] Adjusted  3. Patient will demonstrate an increase in right shoulder (flex, ABD, IR, and ER) strength to 4+/5 to allow for proper functional mobility as indicated by patients Functional Deficits. [] Progressing: [] Met: [] Not Met: [] Adjusted  4. Patient will return to ADLs without increased symptoms or restriction. [] Progressing: [] Met: [] Not Met: [] Adjusted  5. Pt will return to work pain free. [] Progressing: [] Met: [] Not Met: [] Adjusted         Overall Progression Towards Functional goals/ Treatment Progress Update:  [] Patient is progressing as expected towards functional goals listed. [] Progression is slowed due to complexities/Impairments listed. [] Progression has been slowed due to co-morbidities. [x] Plan just implemented, too soon to assess goals progression <30days   [] Goals require adjustment due to lack of progress  [] Patient is not progressing as expected and requires additional follow up with physician  [] Other    Prognosis for POC: [x] Good [] Fair  [] Poor      Patient requires continued skilled intervention: [x] Yes  [] No      Treatment/Activity Tolerance:  [x] Patient tolerated treatment well [] Patient limited by fatique  [] Patient limited by pain  [] Patient limited by other medical complications  [] Other:     Patient education: Patient education on PT and plan of care including diagnosis, prognosis, treatment goals and options. Patient agrees with discussed POC and treatment and is aware of rehab process.  3/25     PLAN: See re- eval  3/25  [] Continue per plan of care [] Alter current plan (see comments above)  [x] Plan of care initiated [] Hold pending MD visit [] Discharge      Electronically signed by:  Brandon Galeano, PT, DPT     Note: If patient does

## 2020-03-25 NOTE — PLAN OF CARE
[facility],[address], [address] [place], [address]  Phone: [phone]   Fax: [phone]      Physical Therapy Certification    Dear Referring Practitioner: Dr. Tanisha Roberts ,    We had the pleasure of evaluating the following patient for physical therapy services at [address] [place] [address]. A summary of our findings can be found in the initial assessment below. This includes our plan of care. If you have any questions or concerns regarding these findings, please do not hesitate to contact me at the office phone number checked above. Thank you for the referral.       Physician Signature:_______________________________Date:__________________  By signing above (or electronic signature), therapists plan is approved by physician      Patient: Bethanie Villar   : 1970   MRN: [id number]  Referring Physician: Referring Practitioner: Dr. Tanisha Roberts       Evaluation Date: 3/25/2020      Medical Diagnosis Information:  Diagnosis: Strain of muscle, fascia and tendon of other parts of biceps, right arm - S46.211, Superior glenoid labrum lesion of right shoulder - S43.431   Treatment Diagnosis: decreased ROM, strength, endurance, and high-level IADLs                                         Insurance information: PT Insurance Information: [facility]    C-SSRS Triggered by Intake questionnaire (Past 2 wk assessment):   [x] No, Questionnaire did not trigger screening.   [] Yes, Patient intake triggered further evaluation      [] C-SSRS Screening completed  [] PCP notified via Plan of Care  [] Emergency services notified     Precautions/ Contra-indications: none   Latex Allergy:  [x]NO      []YES  Preferred Language for Healthcare:   [x]English       []other:    SUBJECTIVE: Patient stated complaint: states returns to PT after waiting for workers comp paperwork to be approved.  He states he continued to stretch and started to go to a gym, but only went a couple of Review Of Systems (ROS):  [x]Performed Review of systems (Integumentary, CardioPulmonary, Neurological) by intake and observation. Intake form has been scanned into medical record. Patient has been instructed to contact their primary care physician regarding ROS issues if not already being addressed at this time. Co-morbidities/Complexities (which will affect course of rehabilitation):   []None           Arthritic conditions   []Rheumatoid arthritis (M05.9)  []Osteoarthritis (M19.91)   Cardiovascular conditions   []Hypertension (I10)  []Hyperlipidemia (E78.5)  []Angina pectoris (I20)  []Atherosclerosis (I70)   Musculoskeletal conditions   []Disc pathology   []Congenital spine pathologies   []Prior surgical intervention  []Osteoporosis (M81.8)  []Osteopenia (M85.8)   Endocrine conditions   []Hypothyroid (E03.9)  []Hyperthyroid Gastrointestinal conditions   []Constipation (Q83.94)   Metabolic conditions   []Morbid obesity (E66.01)  [x]Diabetes type 2 (E11.65)   []Neuropathy (G60.9)     Pulmonary conditions   []Asthma (J45)  []Coughing   []COPD (J44.9)   Psychological Disorders  []Anxiety (F41.9)  []Depression (F32.9)   []Other:   []Other:          Barriers to/and or personal factors that will affect rehab potential:              []Age  []Sex              []Motivation/Lack of Motivation                        []Co-Morbidities              []Cognitive Function, education/learning barriers              []Environmental, home barriers              []profession/work barriers  [x]past PT/medical experience  []other:  Justification: Pt is a 46year old male with a history of R shoulder surgeries, which may affect rehab potential.      Falls Risk Assessment (30 days):   [x] Falls Risk assessed and no intervention required.   [] Falls Risk assessed and Patient requires intervention due to being higher risk   TUG score (>12s at risk):     [] Falls education provided, including         ASSESSMENT:   Functional Impairments   []Noted spinal or UE joint hypomobility   []Noted spinal or UE joint hypermobility   [x]Decreased UE functional ROM   [x]Decreased UE functional strength   []Abnormal reflexes/sensation/myotomal/dermatomal deficits   [x]Decreased RC/scapular/core strength and neuromuscular control   []other:      Functional Activity Limitations (from functional questionnaire and intake)   []Reduced ability to tolerate prolonged functional positions   []Reduced ability or difficulty with changes of positions or transfers between positions   []Reduced ability to maintain good posture and demonstrate good body mechanics with sitting, bending, and lifting   [] Reduced ability or tolerance with driving and/or computer work   [x]Reduced ability to sleep   [x]Reduced ability to perform lifting, reaching, carrying tasks   []Reduced ability to tolerate impact through UE   [x]Reduced ability to reach behind back   []Reduced ability to  or hold objects   []Reduced ability to throw or toss an object   []other:      Participation Restrictions   [x]Reduced participation in self care activities   [x]Reduced participation in home management activities   [x]Reduced participation in work activities   [x]Reduced participation in social activities. []Reduced participation in sport / recreational activities. Classification:   [x]Signs/symptoms consistent with post-surgical status including decreased ROM, strength and function.   []Signs/symptoms consistent with joint sprain/strain   []Signs/symptoms consistent with shoulder impingement   []Signs/symptoms consistent with shoulder/elbow/wrist tendinopathy   []Signs/symptoms consistent with Rotator cuff tear   []Signs/symptoms consistent with labral tear   []Signs/symptoms consistent with postural dysfunction    []Signs/symptoms consistent with Glenohumeral IR Deficit - <45 degrees   []Signs/symptoms consistent with facet dysfunction of cervical/thoracic spine    []Signs/symptoms consistent with pathology which may benefit from Dry needling     []other:     Prognosis/Rehab Potential:      []Excellent   [x]Good    []Fair   []Poor    Tolerance of evaluation/treatment:    []Excellent   [x]Good    []Fair   []Poor    Physical Therapy Evaluation Complexity Justification  [x] A history of present problem with:  [] no personal factors and/or comorbidities that impact the plan of care;  [x]1-2 personal factors and/or comorbidities that impact the plan of care  []3 personal factors and/or comorbidities that impact the plan of care  [x] An examination of body systems using standardized tests and measures addressing any of the following: body structures and functions (impairments), activity limitations, and/or participation restrictions;:  [] a total of 1-2 or more elements   [] a total of 3 or more elements   [x] a total of 4 or more elements   [x] A clinical presentation with:  [x] stable and/or uncomplicated characteristics   [] evolving clinical presentation with changing characteristics  [] unstable and unpredictable characteristics;   [x] Clinical decision making of [x] low, [] moderate, [] high complexity using standardized patient assessment instrument and/or measurable assessment of functional outcome. [] EVAL (LOW) 74594 (typically 20 minutes face-to-face)  [] EVAL (MOD) 38283 (typically 30 minutes face-to-face)  [] EVAL (HIGH) 30679 (typically 45 minutes face-to-face)  [x] RE-EVAL     Reviewed insurance benefits for physical therapy in an outpatient hospital based setting with the patient, including deductible and allowable visit number.       PLAN:  Frequency/Duration:  1-2 day per week for 6-8 Weeks:  INTERVENTIONS:  [x] Therapeutic exercise including: strength training, ROM, for Upper extremity and core   [x]  NMR activation and proprioception for UE, scap and Core   [x] Manual therapy as indicated for shoulder, scapula and spine to include: Dry Needling/IASTM, STM, PROM, Gr I-IV

## 2020-03-30 ENCOUNTER — HOSPITAL ENCOUNTER (OUTPATIENT)
Dept: PHYSICAL THERAPY | Age: 50
Setting detail: THERAPIES SERIES
Discharge: HOME | End: 2020-03-30
Payer: COMMERCIAL

## 2020-03-30 PROCEDURE — 97112 NEUROMUSCULAR REEDUCATION: CPT | Performed by: PHYSICAL THERAPIST

## 2020-03-30 PROCEDURE — 97110 THERAPEUTIC EXERCISES: CPT | Performed by: PHYSICAL THERAPIST

## 2020-03-30 NOTE — FLOWSHEET NOTE
[facility], [address] [place], [address]  Phone: [phone]   Fax: [phone]    Physical Therapy Treatment Note/ Progress Report:       Date:  3/30/2020    Patient Name:  Sheria Gitelman    :  1970  MRN: [id number]  Restrictions/Precautions:    Medical/Treatment Diagnosis Information:  Diagnosis: Strain of muscle, fascia and tendon of other parts of biceps, right arm - S46.211, Superior glenoid labrum lesion of right shoulder - S43.431  Treatment Diagnosis: decreased ROM, strength, endurance, and high-level IADLs  Insurance/Certification information:  PT Insurance Information: [facility]  Physician Information:  Referring Practitioner: Dr. Cantu Less   Has the plan of care been signed (Y/N):        []  Yes  [x]  No     Date of Patient follow up with Physician:  (see workers comp MD on )      Is this a Progress Report:     []  Yes  [x]  No      Progress report/ Recertification will be due (10 Rx or 30 days whichever is less): 2020      Visit # Insurance Allowable Auth Required   2 12 []  Yes []  No        Functional Scale:   UEFI  3/25 - 69% limitation          Latex Allergy:  [x]NO      []YES  Preferred Language for Healthcare:   [x]English       []other:      Pain level:  5/10 3/30    SUBJECTIVE:  Pt states he is having a lot of popping today. He states HEP is going well at home.    3/30    OBJECTIVE: See eval 3/25   Observation:    Test measurements:      RESTRICTIONS/PRECAUTIONS: none    Exercises/Interventions:     Therapeutic Ex (29550) Sets/sec Reps Notes/CUES   Bike 10'  5 min overhand, 5 min underhand - Added 3/30   Pendulum hang            Wall walks - flex and ABD 10 sec 10 Added 3/25   ER cane - supine 10 sec 10 Added 3/25   IR towel 10 sec 10 Added 3/25         Scapular retraction  3 10 Black TB, Added 3/25   Shoulder ext 3 10 Black TB, Added 3/25   Shoulder ER 3 10 Black TB, Added 3/25   Shoulder IR 3 10 Black TB, Added 3/25         Manual Intervention (25114)      Shoulder stretching with mobilizations and distraction 8'  Grade III-IV, Added 3/25                                 NMR re-education (39369)   CUES NEEDED         Shoulder Scaption - standing 3 10 Added 3/30   Shoulder ABD            Serratus 3 10 6#, Added 3/25   Lat pulldowns      shrugs 3 10 6#, Added 3/25   Biceps 3 10 6#, Added 3/25   Triceps  3 10 6#, Added 3/25         Ball on the wall  3 10 Kickball, 4-way, Added 3/25   Shoulder PNF  3 10 1#, Added 3/30         Therapeutic Activity (65726)                      Therapeutic Exercise and NMR EXR  [x] (30168) Provided verbal/tactile cueing for activities related to strengthening, flexibility, endurance, ROM  for improvements in scapular, scapulothoracic and UE control with self care, reaching, carrying, lifting, house/yardwork, driving/computer work. [x] (96135) Provided verbal/tactile cueing for activities related to improving balance, coordination, kinesthetic sense, posture, motor skill, proprioception  to assist with  scapular, scapulothoracic and UE control with self care, reaching, carrying, lifting, house/yardwork, driving/computer work. Therapeutic Activities:    [x] (64501 or 13043) Provided verbal/tactile cueing for activities related to improving balance, coordination, kinesthetic sense, posture, motor skill, proprioception and motor activation to allow for proper function of scapular, scapulothoracic and UE control with self care, carrying, lifting, driving/computer work.      Home Exercise Program:    [x] (11543) Reviewed/Progressed HEP activities related to strengthening, flexibility, endurance, ROM of scapular, scapulothoracic and UE control with self care, reaching, carrying, lifting, house/yardwork, driving/computer work  [] (59930) Reviewed/Progressed HEP activities related to improving balance, coordination, kinesthetic sense, posture, motor skill, proprioception of scapular, scapulothoracic and UE control with self care, reaching, carrying, lifting, house/yardwork, driving/computer work      Manual Treatments:  PROM / STM / Oscillations-Mobs:  G-I, II, III, IV (PA's, Inf., Post.)  [x] (70010) Provided manual therapy to mobilize soft tissue/joints of cervical/CT, scapular GHJ and UE for the purpose of modulating pain, promoting relaxation,  increasing ROM, reducing/eliminating soft tissue swelling/inflammation/restriction, improving soft tissue extensibility and allowing for proper ROM for normal function with self care, reaching, carrying, lifting, house/yardwork, driving/computer work    Modalities: Ice to go 3/30   [] GAME READY (VASO)- for significant edema, swelling, pain control. Charges: 11:00 - 11:55  Timed Code Treatment Minutes: 48'    Total Treatment Minutes: 54'        [] EVAL (LOW) 74272 (typically 20 minutes face-to-face)  [] EVAL (MOD) 63651 (typically 30 minutes face-to-face)  [] EVAL (HIGH) 05725 (typically 45 minutes face-to-face)  [] RE-EVAL     [x] DJ(35980) x  2' (11:00 - 11:30, 27')  [] IONTO  [x] NMR (38620) x 2 (11:31 - 11:54, 23')    [] VASO  [] Manual (22650) x 1      [] Other:  [] TA x      [] Mech Traction (21394)  [] ES(attended) (44566)      [] ES (un) (39874):         ASSESSMENT:        GOALS:  Patient stated goal: throw a ball and return to bowling    [] Progressing: [] Met: [] Not Met: [] Adjusted    Therapist goals for Patient:   Short Term Goals: To be achieved in: 2 weeks  1. Independent in HEP and progression per patient tolerance, in order to prevent re-injury. [] Progressing: [] Met: [] Not Met: [] Adjusted   2. Patient will have a decrease in pain to facilitate improvement in movement, function, and ADLs as indicated by Functional Deficits. [] Progressing: [] Met: [] Not Met: [] Adjusted    Long Term Goals: To be achieved in: 6-8 weeks  1.  Disability index score of 35% or less for the UEFI to assist with reaching prior level of

## 2020-04-07 ENCOUNTER — HOSPITAL ENCOUNTER (OUTPATIENT)
Dept: PHYSICAL THERAPY | Age: 50
Setting detail: THERAPIES SERIES
Discharge: HOME | End: 2020-04-07
Payer: COMMERCIAL

## 2020-04-07 PROCEDURE — 97112 NEUROMUSCULAR REEDUCATION: CPT | Performed by: PHYSICAL THERAPIST

## 2020-04-07 PROCEDURE — 97110 THERAPEUTIC EXERCISES: CPT | Performed by: PHYSICAL THERAPIST

## 2020-04-07 NOTE — FLOWSHEET NOTE
[facility], [address] [place], [address]  Phone: [phone]   Fax: [phone]    Physical Therapy Treatment Note/ Progress Report:       Date:  2020    Patient Name:  Roz Castañeda    :  1970  MRN: [id number]  Restrictions/Precautions:    Medical/Treatment Diagnosis Information:  Diagnosis: Strain of muscle, fascia and tendon of other parts of biceps, right arm - S46.211, Superior glenoid labrum lesion of right shoulder - S43.431  Treatment Diagnosis: decreased ROM, strength, endurance, and high-level IADLs  Insurance/Certification information:  PT Insurance Information: [facility]  Physician Information:  Referring Practitioner: Dr. Mckoy Cos   Has the plan of care been signed (Y/N):        []  Yes  [x]  No     Date of Patient follow up with Physician:  (see workers comp MD on )      Is this a Progress Report:     []  Yes  [x]  No      Progress report/ Recertification will be due (10 Rx or 30 days whichever is less): 2020      Visit # Insurance Allowable Auth Required   3 12 []  Yes []  No        Functional Scale:   UEFI  3/25 - 69% limitation          Latex Allergy:  [x]NO      []YES  Preferred Language for Healthcare:   [x]English       []other:      Pain level:  5/10     SUBJECTIVE:  Pt states when he \"works it out\" with his HEP he has some pain, but is fine if he is just at home.       OBJECTIVE: See eval 3/25   Observation:    Test measurements:      RESTRICTIONS/PRECAUTIONS: none    Exercises/Interventions:     Therapeutic Ex (90360) Sets/sec Reps Notes/CUES   Bike 10'  5 min overhand, 5 min underhand - Added 3/30   Pendulum hang            Wall walks - flex and ABD 10 sec 10 Added 3/25   ER cane - supine 10 sec 10 Added 325   IR towel 10 sec 10 Added 3/25         Scapular retraction  4 10 BLack TB, ^   Shoulder ext 3 10 Black TB, Added 3/25   Shoulder ER 3 10 Black TB, Added 3/25   Shoulder IR 3 10 Black TB, Added 3/25         Manual Intervention (77606)      Shoulder stretching with mobilizations and distraction                               NMR re-education (67179)   CUES NEEDED         Shoulder Scaption - standing 3 10 2#, ^4/7   Shoulder ABD            Serratus 3 10 7#, ^4/7   Lat pulldowns      shrugs 3 10 7#, ^4/7   Biceps 3 10 7#, ^4/7   Triceps  3 10 7#, ^4/7         Ball on the wall  3 10 Kickball, 4-way, Added 3/25   Shoulder PNF  3 10 1#, Added 3/30         Therapeutic Activity (23497)                      Therapeutic Exercise and NMR EXR  [x] (92517) Provided verbal/tactile cueing for activities related to strengthening, flexibility, endurance, ROM  for improvements in scapular, scapulothoracic and UE control with self care, reaching, carrying, lifting, house/yardwork, driving/computer work. [x] (87788) Provided verbal/tactile cueing for activities related to improving balance, coordination, kinesthetic sense, posture, motor skill, proprioception  to assist with  scapular, scapulothoracic and UE control with self care, reaching, carrying, lifting, house/yardwork, driving/computer work. Therapeutic Activities:    [x] (12032 or 24747) Provided verbal/tactile cueing for activities related to improving balance, coordination, kinesthetic sense, posture, motor skill, proprioception and motor activation to allow for proper function of scapular, scapulothoracic and UE control with self care, carrying, lifting, driving/computer work.      Home Exercise Program:    [x] (91423) Reviewed/Progressed HEP activities related to strengthening, flexibility, endurance, ROM of scapular, scapulothoracic and UE control with self care, reaching, carrying, lifting, house/yardwork, driving/computer work  [] (69162) Reviewed/Progressed HEP activities related to improving balance, coordination, kinesthetic sense, posture, motor skill, proprioception of scapular, scapulothoracic and UE control with self care, reaching, options. Patient agrees with discussed POC and treatment and is aware of rehab process. 3/25     PLAN: See re- eval  3/25  [] Continue per plan of care [] Alter current plan (see comments above)  [x] Plan of care initiated [] Hold pending MD visit [] Discharge      Electronically signed by:  hSaheed Robertson, PT, DPT     Note: If patient does not return for scheduled/ recommended follow up visits, this note will serve as a discharge from care along with most recent update on progress.

## 2020-04-13 ENCOUNTER — TELEPHONE (OUTPATIENT)
Dept: ORTHOPEDIC SURGERY | Age: 50
End: 2020-04-13

## 2020-04-13 ENCOUNTER — HOSPITAL ENCOUNTER (OUTPATIENT)
Dept: PHYSICAL THERAPY | Age: 50
Setting detail: THERAPIES SERIES
Discharge: HOME | End: 2020-04-13
Payer: COMMERCIAL

## 2020-04-13 PROCEDURE — 97110 THERAPEUTIC EXERCISES: CPT | Performed by: PHYSICAL THERAPIST

## 2020-04-13 PROCEDURE — 97112 NEUROMUSCULAR REEDUCATION: CPT | Performed by: PHYSICAL THERAPIST

## 2020-04-13 NOTE — TELEPHONE ENCOUNTER
I left a message for the patient to call back. He was on the schedule today to do a virtual visit with Dr. Ann Drew. We need to reschedule his virtual visit.

## 2020-04-13 NOTE — FLOWSHEET NOTE
TB x 2 sets, silver TB x 1, ^4/13   Shoulder IR 3 10 Black TB,x 2 sets, silver TB x 1, ^4/13         Biceps 3 10 7#, ^4/7   Triceps  3 10 7#, ^4/7   shrugs 3 10 7#, ^4/7         Manual Intervention (96254)      Shoulder stretching with mobilizations and distraction                   NMR re-education (88345)   CUES NEEDED         Shoulder Scaption - standing 3 10 2#, ^4/7   Shoulder ABD            Serratus 3 10 8#, ^4/13   Lat pulldowns            Ball on the wall  3 10 Kickball, 4-way, Added 3/25   Shoulder PNF  3 10 1#, Added 3/30         Prone T 3 10 Added 4/13               Therapeutic Activity (27741)      Wall push-ups 3 10 Added 4/13              Therapeutic Exercise and NMR EXR  [x] (22484) Provided verbal/tactile cueing for activities related to strengthening, flexibility, endurance, ROM  for improvements in scapular, scapulothoracic and UE control with self care, reaching, carrying, lifting, house/yardwork, driving/computer work. [x] (56685) Provided verbal/tactile cueing for activities related to improving balance, coordination, kinesthetic sense, posture, motor skill, proprioception  to assist with  scapular, scapulothoracic and UE control with self care, reaching, carrying, lifting, house/yardwork, driving/computer work. Therapeutic Activities:    [x] (02008 or 54134) Provided verbal/tactile cueing for activities related to improving balance, coordination, kinesthetic sense, posture, motor skill, proprioception and motor activation to allow for proper function of scapular, scapulothoracic and UE control with self care, carrying, lifting, driving/computer work.      Home Exercise Program:    [x] (37321) Reviewed/Progressed HEP activities related to strengthening, flexibility, endurance, ROM of scapular, scapulothoracic and UE control with self care, reaching, carrying, lifting, house/yardwork, driving/computer work  [] (65093) Reviewed/Progressed HEP activities related to improving balance, coordination, kinesthetic sense, posture, motor skill, proprioception of scapular, scapulothoracic and UE control with self care, reaching, carrying, lifting, house/yardwork, driving/computer work      Manual Treatments:  PROM / STM / Oscillations-Mobs:  G-I, II, III, IV (PA's, Inf., Post.)  [x] (17852) Provided manual therapy to mobilize soft tissue/joints of cervical/CT, scapular GHJ and UE for the purpose of modulating pain, promoting relaxation,  increasing ROM, reducing/eliminating soft tissue swelling/inflammation/restriction, improving soft tissue extensibility and allowing for proper ROM for normal function with self care, reaching, carrying, lifting, house/yardwork, driving/computer work    Modalities: Ice to go 4/7   [] GAME READY (VASO)- for significant edema, swelling, pain control. Charges: 11:00 - 12:00  Timed Code Treatment Minutes: 54'    Total Treatment Minutes: 61'       [] EVAL (LOW) 69623 (typically 20 minutes face-to-face)  [] EVAL (MOD) 83851 (typically 30 minutes face-to-face)  [] EVAL (HIGH) 99373 (typically 45 minutes face-to-face)  [] RE-EVAL     [x] GW(14877) x  2' (11:00 - 11:30, 27')  [] IONTO  [x] NMR (03835) x 2 (11:32 - 11:57, 25')    [] VASO  [] Manual (92978) x 1      [] Other:  [] TA x 1      [] Mech Traction (34492)  [] ES(attended) (34314)      [] ES (un) (36965):         ASSESSMENT:        GOALS:  Patient stated goal: throw a ball and return to bowling    [] Progressing: [] Met: [] Not Met: [] Adjusted    Therapist goals for Patient:   Short Term Goals: To be achieved in: 2 weeks  1. Independent in HEP and progression per patient tolerance, in order to prevent re-injury. [] Progressing: [] Met: [] Not Met: [] Adjusted   2. Patient will have a decrease in pain to facilitate improvement in movement, function, and ADLs as indicated by Functional Deficits. [] Progressing: [] Met: [] Not Met: [] Adjusted    Long Term Goals: To be achieved in: 6-8 weeks  1.  Disability

## 2020-04-14 ENCOUNTER — VIRTUAL VISIT (OUTPATIENT)
Dept: ORTHOPEDIC SURGERY | Age: 50
End: 2020-04-14
Payer: COMMERCIAL

## 2020-04-14 VITALS — HEIGHT: 70 IN | BODY MASS INDEX: 29.2 KG/M2 | WEIGHT: 204 LBS | RESPIRATION RATE: 16 BRPM

## 2020-04-14 PROCEDURE — 99213 OFFICE O/P EST LOW 20 MIN: CPT | Performed by: ORTHOPAEDIC SURGERY

## 2020-04-14 NOTE — PROGRESS NOTES
Coronavirus Preparedness and Response Supplemental Appropriations Act, this Virtual  Visit was conducted, with patient's consent, to reduce the patient's risk of exposure to COVID-19 and provide continuity of care for an established patient. Services were provided through a video synchronous discussion virtually to substitute for in-person clinic visit. An  electronic signature was used to authenticate this note.   Electronically signed by Alverto Tobin MD on 4/14/2020 at 12:51 PM

## 2020-04-21 ENCOUNTER — HOSPITAL ENCOUNTER (OUTPATIENT)
Dept: PHYSICAL THERAPY | Age: 50
Setting detail: THERAPIES SERIES
Discharge: HOME | End: 2020-04-21
Payer: COMMERCIAL

## 2020-04-21 PROCEDURE — 97110 THERAPEUTIC EXERCISES: CPT | Performed by: PHYSICAL THERAPIST

## 2020-04-21 PROCEDURE — 97112 NEUROMUSCULAR REEDUCATION: CPT | Performed by: PHYSICAL THERAPIST

## 2020-04-21 NOTE — PLAN OF CARE
[address] 75, [address] [place], [address]  Phone: [phone]   Fax: [phone]     Physical Therapy Re-Certification Plan of Care    Dear Dr. Keara Lang,    We had the pleasure of treating the following patient for physical therapy services at [address] [place] [address]. A summary of our findings can be found in the updated assessment below. This includes our plan of care. If you have any questions or concerns regarding these findings, please do not hesitate to contact me at the office phone number checked above. Thank you for the referral.     Physician Signature:________________________________Date:__________________  By signing above (or electronic signature), therapists plan is approved by physician      Overall Response to Treatment:   []Patient is responding well to treatment and improvement is noted with regards  to goals   []Patient should continue to improve in reasonable time if they continue HEP   []Patient has plateaued and is no longer responding to skilled PT intervention    []Patient is getting worse and would benefit from return to referring MD   []Patient unable to adhere to initial POC   []Other: Pt demonstrates improved right shoulder ROM. He maintains shoulder strength, but continues to lack shoulder ABD and ER strength. Recommend pt continue with current POC to progress strength training and begin functional training to return to PLOF.       Date range of previous POC Visits: 3/25/20 - 20    Total Visits: 6      Physical Therapy Treatment Note/ Progress Report:       Date:  2020    Patient Name:  Richard Benavidez    :  1970  MRN: [id number]  Restrictions/Precautions:    Medical/Treatment Diagnosis Information:  Diagnosis: Strain of muscle, fascia and tendon of other parts of biceps, right arm - S46.211, Superior glenoid labrum lesion of right shoulder - S43.431  Treatment Diagnosis: decreased 10 Silver TB x 3 ^4/21   Shoulder ext 3 10 Silver TBx 3 ^4/21   Shoulder ER 3 10 Silver TB x 3 ^4/21   Shoulder IR 3 10 Silver TB,x 3 ^4/21         Biceps 3 10 8#, ^4/21   Triceps  3 10 8#, ^4/21   shrugs 3 10 8#, ^4/21         Manual Intervention (29108)      Shoulder stretching with mobilizations and distraction             NMR re-education (78217)   CUES NEEDED   Shoulder Scaption - standing 3 10 2#, ^4/7   Shoulder ABD            Serratus 3 10 8#, ^4/13   Lat pulldowns            Ball on the wall  3 10 Kickball, 4-way, Added 3/25   Shoulder PNF  3 10 1#, Added 3/30         Prone T 3 10 Added 4/13   Prone Y 3 10 Added 4/21         Therapeutic Activity (56244)      Wall push-ups 3 10 Added 4/13              Therapeutic Exercise and NMR EXR  [x] (69685) Provided verbal/tactile cueing for activities related to strengthening, flexibility, endurance, ROM  for improvements in scapular, scapulothoracic and UE control with self care, reaching, carrying, lifting, house/yardwork, driving/computer work. [x] (54358) Provided verbal/tactile cueing for activities related to improving balance, coordination, kinesthetic sense, posture, motor skill, proprioception  to assist with  scapular, scapulothoracic and UE control with self care, reaching, carrying, lifting, house/yardwork, driving/computer work. Therapeutic Activities:    [x] (92181 or 53546) Provided verbal/tactile cueing for activities related to improving balance, coordination, kinesthetic sense, posture, motor skill, proprioception and motor activation to allow for proper function of scapular, scapulothoracic and UE control with self care, carrying, lifting, driving/computer work.      Home Exercise Program:    [x] (52868) Reviewed/Progressed HEP activities related to strengthening, flexibility, endurance, ROM of scapular, scapulothoracic and UE control with self care, reaching, carrying, lifting, house/yardwork, driving/computer work  [] (11389) Recommend pt continue with current POC to progress strength training and begin functional training to return to PLOF.    4/21    Patient education: Patient education on PT and plan of care including diagnosis, prognosis, treatment goals and options. Patient agrees with discussed POC and treatment and is aware of rehab process. 3/25     PLAN: 1x/ week for 4 weeks (4/21 - 5/19)  [x] Continue per plan of care [] Alter current plan (see comments above)  [] Plan of care initiated [] Hold pending MD visit [] Discharge      Electronically signed by:  Bhavesh Chávez, PT, DPT     Note: If patient does not return for scheduled/ recommended follow up visits, this note will serve as a discharge from care along with most recent update on progress.

## 2020-04-28 ENCOUNTER — HOSPITAL ENCOUNTER (OUTPATIENT)
Dept: PHYSICAL THERAPY | Age: 50
Setting detail: THERAPIES SERIES
Discharge: HOME | End: 2020-04-28
Payer: COMMERCIAL

## 2020-04-28 PROCEDURE — 97112 NEUROMUSCULAR REEDUCATION: CPT | Performed by: PHYSICAL THERAPIST

## 2020-04-28 PROCEDURE — 97110 THERAPEUTIC EXERCISES: CPT | Performed by: PHYSICAL THERAPIST

## 2020-04-28 NOTE — FLOWSHEET NOTE
Mobility/Transfers: decreased use of RUE     Posture: rounded shoulders      Gait: (include devices/WB status): WNL        RESTRICTIONS/PRECAUTIONS: none    Exercises/Interventions:     Therapeutic Ex (94857) Sets/sec Reps Notes/CUES   Bike 10'  5 min overhand, 5 min underhand - Added 3/30   Pendulum hang            Wall walks - flex and ABD 10 sec 10 Added 3/25   ER cane - supine 10 sec 10 Added 3/25   IR towel 10 sec 10 Added 3/25         Scapular retraction  4 10 Silver TB x 3 ^4/21   Shoulder ext 3 10 Silver TBx 3 ^4/21   Shoulder ER 3 10 Silver TB x 3 ^4/21   Shoulder IR 3 10 Silver TB,x 3 ^4/21         Biceps 3 10 9#, ^4/28   Triceps  3 10 9#, ^4/28   shrugs 3 10 9#, ^4/21         Manual Intervention (47676)      Shoulder stretching with mobilizations and distraction Standing shoulder flex to 90 deg with perturbations  15 sec 5 Added 4/28         NMR re-education (15676)   CUES NEEDED   Shoulder Scaption - standing 3 10 2#, ^4/28   Shoulder ABD            Serratus 3 10 9#, ^4/28   Lat pulldowns            Ball on the wall  3 10 2lb, 4-way, ^4/28   Shoulder PNF  3 10 3#, ^4/28         Prone T 3 10 2#, ^4/28   Prone Y 3 10 2#, ^4/28                     Therapeutic Activity (57724)      Wall push-ups 3 10 Added 4/13              Therapeutic Exercise and NMR EXR  [x] (03685) Provided verbal/tactile cueing for activities related to strengthening, flexibility, endurance, ROM  for improvements in scapular, scapulothoracic and UE control with self care, reaching, carrying, lifting, house/yardwork, driving/computer work. [x] (02007) Provided verbal/tactile cueing for activities related to improving balance, coordination, kinesthetic sense, posture, motor skill, proprioception  to assist with  scapular, scapulothoracic and UE control with self care, reaching, carrying, lifting, house/yardwork, driving/computer work.     Therapeutic Activities:    [x] (73316 or 43186) Provided verbal/tactile cueing for activities related to improving balance, coordination, kinesthetic sense, posture, motor skill, proprioception and motor activation to allow for proper function of scapular, scapulothoracic and UE control with self care, carrying, lifting, driving/computer work. Home Exercise Program:    [x] (32974) Reviewed/Progressed HEP activities related to strengthening, flexibility, endurance, ROM of scapular, scapulothoracic and UE control with self care, reaching, carrying, lifting, house/yardwork, driving/computer work  [] (38572) Reviewed/Progressed HEP activities related to improving balance, coordination, kinesthetic sense, posture, motor skill, proprioception of scapular, scapulothoracic and UE control with self care, reaching, carrying, lifting, house/yardwork, driving/computer work      Manual Treatments:  PROM / STM / Oscillations-Mobs:  G-I, II, III, IV (PA's, Inf., Post.)  [x] (19083) Provided manual therapy to mobilize soft tissue/joints of cervical/CT, scapular GHJ and UE for the purpose of modulating pain, promoting relaxation,  increasing ROM, reducing/eliminating soft tissue swelling/inflammation/restriction, improving soft tissue extensibility and allowing for proper ROM for normal function with self care, reaching, carrying, lifting, house/yardwork, driving/computer work    Modalities: Ice to go 4/28   [] GAME READY (VASO)- for significant edema, swelling, pain control.      Charges: 11:00 - 12:00   Timed Code Treatment Minutes: 54'     Total Treatment Minutes: 61'       [] EVAL (LOW) 92347 (typically 20 minutes face-to-face)  [] EVAL (MOD) 62881 (typically 30 minutes face-to-face)  [] EVAL (HIGH) 71681 (typically 45 minutes face-to-face)  [] RE-EVAL     [x] YC(45974) x  2 (11:00 - 11:32 30')  [] IONTO  [x] NMR (98263) x 2 (11:32 - 11:57,'25)    [] VASO  [] Manual (59165) x     [] Other:  [] TA x   [] Mech Traction (56243)  [] ES(attended) (18655)      [] ES (un) (97653):         ASSESSMENT:        GOALS: 4/21  Patient stated goal: throw a ball and return to bowling    [x] Progressing: [] Met: [] Not Met: [] Adjusted    Therapist goals for Patient:   Short Term Goals: To be achieved in: 2 weeks  1. Independent in HEP and progression per patient tolerance, in order to prevent re-injury. [] Progressing: [x] Met: [] Not Met: [] Adjusted   2. Patient will have a decrease in pain to facilitate improvement in movement, function, and ADLs as indicated by Functional Deficits. [] Progressing: [x] Met: [] Not Met: [] Adjusted    Long Term Goals: To be achieved in: 6-8 weeks  1. Disability index score of 35% or less for the UEFI to assist with reaching prior level of function. [x] Progressing: [] Met: [] Not Met: [] Adjusted  2. Patient will demonstrate increased right shoulder flex and ABD AROM to greater than or equal to 180 deg, ER AROM to greater than or equal to 90 deg, and IR AROM to greater than or equal to 65 deg to allow for proper joint functioning as indicated by patients Functional Deficits. [x] Progressing: [] Met: [] Not Met: [] Adjusted  3. Patient will demonstrate an increase in right shoulder (flex, ABD, IR, and ER) strength to 4+/5 to allow for proper functional mobility as indicated by patients Functional Deficits. [x] Progressing: [] Met: [] Not Met: [] Adjusted  4. Patient will return to ADLs without increased symptoms or restriction. [x] Progressing: [] Met: [] Not Met: [] Adjusted  5. Pt will return to work pain free. [x] Progressing: [] Met: [] Not Met: [] Adjusted         Overall Progression Towards Functional goals/ Treatment Progress Update:  [x] Patient is progressing as expected towards functional goals listed. [] Progression is slowed due to complexities/Impairments listed. [] Progression has been slowed due to co-morbidities.   [] Plan just implemented, too soon to assess goals progression <30days   [] Goals require adjustment due to lack of progress  [] Patient is not progressing as expected and requires additional follow up with physician  [] Other    Prognosis for POC: [x] Good [] Fair  [] Poor      Patient requires continued skilled intervention: [x] Yes  [] No      Treatment/Activity Tolerance:  [x] Patient tolerated treatment well [] Patient limited by fatique  [] Patient limited by pain  [] Patient limited by other medical complications  [] Other: Pt christi session well. He continues to progress well with strength training. He christi the addition of shoulder flex perturbations, but had difficulty. 4/28    Patient education: Patient education on PT and plan of care including diagnosis, prognosis, treatment goals and options. Patient agrees with discussed POC and treatment and is aware of rehab process. 3/25     PLAN: 1x/ week for 4 weeks (4/21 - 5/19)  [x] Continue per plan of care [] Alter current plan (see comments above)  [] Plan of care initiated [] Hold pending MD visit [] Discharge      Electronically signed by:  Dani Givens PT, DPT     Note: If patient does not return for scheduled/ recommended follow up visits, this note will serve as a discharge from care along with most recent update on progress.

## 2020-05-05 ENCOUNTER — HOSPITAL ENCOUNTER (OUTPATIENT)
Dept: PHYSICAL THERAPY | Age: 50
Setting detail: THERAPIES SERIES
Discharge: HOME | End: 2020-05-05
Payer: COMMERCIAL

## 2020-05-05 PROCEDURE — 97112 NEUROMUSCULAR REEDUCATION: CPT | Performed by: PHYSICAL THERAPIST

## 2020-05-05 PROCEDURE — 97110 THERAPEUTIC EXERCISES: CPT | Performed by: PHYSICAL THERAPIST

## 2020-05-05 PROCEDURE — 97530 THERAPEUTIC ACTIVITIES: CPT | Performed by: PHYSICAL THERAPIST

## 2020-05-05 NOTE — FLOWSHEET NOTE
driving/computer work. Therapeutic Activities:    [x] (47574 or 58954) Provided verbal/tactile cueing for activities related to improving balance, coordination, kinesthetic sense, posture, motor skill, proprioception and motor activation to allow for proper function of scapular, scapulothoracic and UE control with self care, carrying, lifting, driving/computer work. Home Exercise Program:    [x] (62755) Reviewed/Progressed HEP activities related to strengthening, flexibility, endurance, ROM of scapular, scapulothoracic and UE control with self care, reaching, carrying, lifting, house/yardwork, driving/computer work  [] (09681) Reviewed/Progressed HEP activities related to improving balance, coordination, kinesthetic sense, posture, motor skill, proprioception of scapular, scapulothoracic and UE control with self care, reaching, carrying, lifting, house/yardwork, driving/computer work      Manual Treatments:  PROM / STM / Oscillations-Mobs:  G-I, II, III, IV (PA's, Inf., Post.)  [x] (78722) Provided manual therapy to mobilize soft tissue/joints of cervical/CT, scapular GHJ and UE for the purpose of modulating pain, promoting relaxation,  increasing ROM, reducing/eliminating soft tissue swelling/inflammation/restriction, improving soft tissue extensibility and allowing for proper ROM for normal function with self care, reaching, carrying, lifting, house/yardwork, driving/computer work    Modalities: Ice to go 5/5   [] GAME READY (VASO)- for significant edema, swelling, pain control.      Charges: 11:35 - 12:38   Timed Code Treatment Minutes: 60'    Total Treatment Minutes: 61'       [] EVAL (LOW) 97234 (typically 20 minutes face-to-face)  [] EVAL (MOD) 12713 (typically 30 minutes face-to-face)  [] EVAL (HIGH) 51778 (typically 45 minutes face-to-face)  [] RE-EVAL     [x] FB(43770) x  2 (11:35 - 12:05, 30')  [] IONTO  [x] NMR (87412) x1 (12: 07 - 12:29, 22')    [] VASO  [] Manual (18955) x     [] Other:  [x] TA x 1 (12:30-12:38, 8')   [] [facility] Traction (22441)  [] ES(attended) (96908)      [] ES (un) (55235):         ASSESSMENT:        GOALS: 4/21  Patient stated goal: throw a ball and return to bowling    [x] Progressing: [] Met: [] Not Met: [] Adjusted    Therapist goals for Patient:   Short Term Goals: To be achieved in: 2 weeks  1. Independent in HEP and progression per patient tolerance, in order to prevent re-injury. [] Progressing: [x] Met: [] Not Met: [] Adjusted   2. Patient will have a decrease in pain to facilitate improvement in movement, function, and ADLs as indicated by Functional Deficits. [] Progressing: [x] Met: [] Not Met: [] Adjusted    Long Term Goals: To be achieved in: 6-8 weeks  1. Disability index score of 35% or less for the UEFI to assist with reaching prior level of function. [x] Progressing: [] Met: [] Not Met: [] Adjusted  2. Patient will demonstrate increased right shoulder flex and ABD AROM to greater than or equal to 180 deg, ER AROM to greater than or equal to 90 deg, and IR AROM to greater than or equal to 65 deg to allow for proper joint functioning as indicated by patients Functional Deficits. [x] Progressing: [] Met: [] Not Met: [] Adjusted  3. Patient will demonstrate an increase in right shoulder (flex, ABD, IR, and ER) strength to 4+/5 to allow for proper functional mobility as indicated by patients Functional Deficits. [x] Progressing: [] Met: [] Not Met: [] Adjusted  4. Patient will return to ADLs without increased symptoms or restriction. [x] Progressing: [] Met: [] Not Met: [] Adjusted  5. Pt will return to work pain free. [x] Progressing: [] Met: [] Not Met: [] Adjusted         Overall Progression Towards Functional goals/ Treatment Progress Update:  [x] Patient is progressing as expected towards functional goals listed. [] Progression is slowed due to complexities/Impairments listed. [] Progression has been slowed due to co-morbidities.   [] Plan just

## 2020-05-12 ENCOUNTER — HOSPITAL ENCOUNTER (OUTPATIENT)
Dept: PHYSICAL THERAPY | Age: 50
Setting detail: THERAPIES SERIES
Discharge: HOME | End: 2020-05-12
Payer: COMMERCIAL

## 2020-05-12 PROCEDURE — 97530 THERAPEUTIC ACTIVITIES: CPT | Performed by: PHYSICAL THERAPIST

## 2020-05-12 PROCEDURE — 97112 NEUROMUSCULAR REEDUCATION: CPT | Performed by: PHYSICAL THERAPIST

## 2020-05-12 PROCEDURE — 97110 THERAPEUTIC EXERCISES: CPT | Performed by: PHYSICAL THERAPIST

## 2020-05-15 ENCOUNTER — TELEPHONE (OUTPATIENT)
Dept: ORTHOPEDIC SURGERY | Age: 50
End: 2020-05-15

## 2020-05-15 NOTE — TELEPHONE ENCOUNTER
Please call patient. States wanted update status if [employer] was sent to [employer]  And he has some questions about it.

## 2020-05-19 ENCOUNTER — HOSPITAL ENCOUNTER (OUTPATIENT)
Dept: PHYSICAL THERAPY | Age: 50
Setting detail: THERAPIES SERIES
Discharge: HOME | End: 2020-05-19
Payer: COMMERCIAL

## 2020-05-19 PROCEDURE — 97110 THERAPEUTIC EXERCISES: CPT | Performed by: PHYSICAL THERAPIST

## 2020-05-19 PROCEDURE — 97112 NEUROMUSCULAR REEDUCATION: CPT | Performed by: PHYSICAL THERAPIST

## 2020-05-19 PROCEDURE — 97530 THERAPEUTIC ACTIVITIES: CPT | Performed by: PHYSICAL THERAPIST

## 2020-05-19 NOTE — PLAN OF CARE
1970  MRN: [id number]  Restrictions/Precautions:    Medical/Treatment Diagnosis Information:  Diagnosis: Strain of muscle, fascia and tendon of other parts of biceps, right arm - S46.211, Superior glenoid labrum lesion of right shoulder - S43.431  Treatment Diagnosis: decreased ROM, strength, endurance, and high-level IADLs  Insurance/Certification information:  PT Insurance Information: [facility]  Physician Information:  Referring Practitioner: Dr. [facility]   Has the plan of care been signed (Y/N):        []  Yes  [x]  No     Date of Patient follow up with Physician: 4/13 (see workers comp MD on 4/2)      Is this a Progress Report:     [x]  Yes  []  No      Progress report/ Recertification will be due (10 Rx or 30 days whichever is less): 2 June 2020      Visit # Insurance Allowable Auth Required   10 12 []  Yes []  No        Functional Scale:   UEFI  3/25 - 69% limitation   4/21 - 63% limitation  5/19 - 63% limitation          Latex Allergy:  [x]NO      []YES  Preferred Language for Healthcare:   [x]English       []other:      Pain level: 4 /10 5/19    SUBJECTIVE:  Pt states he has had some trouble gripping and some pain with his wrist this past weekend. He states his shoulder is throbbing today. 5/19    OBJECTIVE: 5/19   Observation:    Test measurements:            ROM AROM  Comment     R L     Flexion 180 180     Abduction 164 182     ER 95 99     IR 61 68     Other(cervical)               Strength L R Comment   Flexion 4/5 4/5      Abduction 4/5 4-/5 + pain      ER 4+/5 4-/5 + pain      IR 4+/5 4-/5        Special Tests Results/Comment   Cohn-Alejandro pos   Neers neg   Speeds     OBriens     Other            Palpation: TTP - no TTP      Functional Mobility/Transfers:   Push strength - 28.8lbs, 30.5lbs, 24.7 lbs      Posture: rounded shoulders      Gait: (include devices/WB status):  WNL      RESTRICTIONS/PRECAUTIONS: none    Exercises/Interventions:     Therapeutic Ex (40716) Sets/sec Reps Notes/CUES stated goal: throw a ball and return to bowling    [x] Progressing: [] Met: [] Not Met: [] Adjusted    Therapist goals for Patient:   Short Term Goals: To be achieved in: 2 weeks  1. Independent in HEP and progression per patient tolerance, in order to prevent re-injury. [] Progressing: [x] Met: [] Not Met: [] Adjusted   2. Patient will have a decrease in pain to facilitate improvement in movement, function, and ADLs as indicated by Functional Deficits. [] Progressing: [x] Met: [] Not Met: [] Adjusted    Long Term Goals: To be achieved in: 6-8 weeks  1. Disability index score of 35% or less for the UEFI to assist with reaching prior level of function. [x] Progressing: [] Met: [] Not Met: [] Adjusted  2. Patient will demonstrate increased right shoulder flex and ABD AROM to greater than or equal to 180 deg, ER AROM to greater than or equal to 90 deg, and IR AROM to greater than or equal to 65 deg to allow for proper joint functioning as indicated by patients Functional Deficits. [] Progressing: [x] Partially Met: [] Not Met: [] Adjusted  3. Patient will demonstrate an increase in right shoulder (flex, ABD, IR, and ER) strength to 4+/5 to allow for proper functional mobility as indicated by patients Functional Deficits. [x] Progressing: [] Met: [] Not Met: [] Adjusted  4. Patient will return to ADLs without increased symptoms or restriction. [x] Progressing: [] Met: [] Not Met: [] Adjusted  5. Pt will return to work pain free. [x] Progressing: [] Met: [] Not Met: [] Adjusted         Overall Progression Towards Functional goals/ Treatment Progress Update:  [x] Patient is progressing as expected towards functional goals listed. [] Progression is slowed due to complexities/Impairments listed. [] Progression has been slowed due to co-morbidities.   [] Plan just implemented, too soon to assess goals progression <30days   [] Goals require adjustment due to lack of progress  [] Patient is not progressing as expected and requires additional follow up with physician  [] Other    Prognosis for POC: [x] Good [] Fair  [] Poor      Patient requires continued skilled intervention: [x] Yes  [] No      Treatment/Activity Tolerance:  [x] Patient tolerated treatment well [] Patient limited by fatique  [] Patient limited by pain  [] Patient limited by other medical complications  [] Other: Pt presents to PT today with throbbing discomfort. He denies pain throughout the session, but reported discomfort with manual resistance training. He continues to lack strength when manually tested, but continues to progress well with strength training, progressing resistance with exercises. He is able to carry a box with 30 lbs a couple of steps with proper form and lift 15lbs to head height without any complaints of pain. Recommend pt continue 1-2 more visits to progress functional strength training to safely return to work. 5/19    Patient education: Patient education on PT and plan of care including diagnosis, prognosis, treatment goals and options. Patient agrees with discussed POC and treatment and is aware of rehab process. 3/25     PLAN: 1x/ week for 2 weeks (5/19 - 6/2)  [x] Continue per plan of care [] Alter current plan (see comments above)  [] Plan of care initiated [] Hold pending MD visit [] Discharge      Electronically signed by:  Ean King PT, DPT     Note: If patient does not return for scheduled/ recommended follow up visits, this note will serve as a discharge from care along with most recent update on progress.

## 2020-05-22 ENCOUNTER — TELEPHONE (OUTPATIENT)
Dept: ORTHOPEDIC SURGERY | Age: 50
End: 2020-05-22

## 2020-05-22 NOTE — TELEPHONE ENCOUNTER
I spoke with the patient. He states that the employer/[employer] hasn't received the most recent [employer]. We faxed the last one a week ago. I informed him that I will fax it again today. He will call back if there are any other issues.

## 2020-05-26 ENCOUNTER — HOSPITAL ENCOUNTER (OUTPATIENT)
Dept: PHYSICAL THERAPY | Age: 50
Setting detail: THERAPIES SERIES
Discharge: HOME | End: 2020-05-26
Payer: COMMERCIAL

## 2020-05-26 NOTE — FLOWSHEET NOTE
Physical Therapy  Cancellation/No-show Note  Patient Name:  David Arguello  :  1970   Date:  2020  Cancelled visits to date: 01  No-shows to date: 0    For today's appointment patient:  [x]  Cancelled  []  Rescheduled appointment  []  No-show     Reason given by patient:  []  Patient ill  []  Conflicting appointment  []  No transportation    []  Conflict with work  []  No reason given  [x]  Other:     Comments: Pt cancelled due to car problems.         Electronically signed by:  Mirella Falk PT

## 2020-05-28 ENCOUNTER — HOSPITAL ENCOUNTER (OUTPATIENT)
Dept: PHYSICAL THERAPY | Age: 50
Setting detail: THERAPIES SERIES
Discharge: HOME | End: 2020-05-28
Payer: COMMERCIAL

## 2020-05-28 PROCEDURE — 97110 THERAPEUTIC EXERCISES: CPT | Performed by: PHYSICAL THERAPIST

## 2020-05-28 PROCEDURE — 97530 THERAPEUTIC ACTIVITIES: CPT | Performed by: PHYSICAL THERAPIST

## 2020-05-28 PROCEDURE — 97112 NEUROMUSCULAR REEDUCATION: CPT | Performed by: PHYSICAL THERAPIST

## 2020-05-28 NOTE — FLOWSHEET NOTE
Progression is slowed due to complexities/Impairments listed. [] Progression has been slowed due to co-morbidities. [] Plan just implemented, too soon to assess goals progression <30days   [] Goals require adjustment due to lack of progress  [] Patient is not progressing as expected and requires additional follow up with physician  [] Other    Prognosis for POC: [x] Good [] Fair  [] Poor      Patient requires continued skilled intervention: [x] Yes  [] No      Treatment/Activity Tolerance:  [x] Patient tolerated treatment well [] Patient limited by fatique  [] Patient limited by pain  [] Patient limited by other medical complications  [] Other: Pt christi session well. He christi progression of strength training to dumbbells with scapular retraction and shoulder IR ,ER, and ext. He did require increased rest breaks with increased resistance. He christi cone stacking above head pain free. He christi progression of WB through UEs with both plank walks and push-ups. He reported increased muscular fatigue by the end of the session. 5/28    Patient education: Patient education on PT and plan of care including diagnosis, prognosis, treatment goals and options. Patient agrees with discussed POC and treatment and is aware of rehab process. 3/25     PLAN: 1x/ week for 2 weeks (5/19 - 6/2)  [x] Continue per plan of care [] Alter current plan (see comments above)  [] Plan of care initiated [] Hold pending MD visit [] Discharge      Electronically signed by:  Sy Lovett, PT, DPT     Note: If patient does not return for scheduled/ recommended follow up visits, this note will serve as a discharge from care along with most recent update on progress.

## 2020-05-29 ENCOUNTER — TELEPHONE (OUTPATIENT)
Dept: ORTHOPEDIC SURGERY | Age: 50
End: 2020-05-29

## 2020-06-01 NOTE — TELEPHONE ENCOUNTER
Carline (PT) and I have discussed this. We recommend he undergo a Functional Capacity Evaluation. Can request on a C9 if that is needed. I think [facility]oper might do them.

## 2020-06-02 ENCOUNTER — APPOINTMENT (OUTPATIENT)
Dept: PHYSICAL THERAPY | Age: 50
End: 2020-06-02
Payer: COMMERCIAL

## 2020-06-02 ENCOUNTER — HOSPITAL ENCOUNTER (OUTPATIENT)
Dept: PHYSICAL THERAPY | Age: 50
Setting detail: THERAPIES SERIES
Discharge: HOME | End: 2020-06-02
Payer: COMMERCIAL

## 2020-06-02 ENCOUNTER — TELEPHONE (OUTPATIENT)
Dept: PHYSICAL THERAPY | Age: 50
End: 2020-06-02

## 2020-06-02 PROCEDURE — 97110 THERAPEUTIC EXERCISES: CPT | Performed by: PHYSICAL THERAPIST

## 2020-06-02 PROCEDURE — 97530 THERAPEUTIC ACTIVITIES: CPT | Performed by: PHYSICAL THERAPIST

## 2020-06-02 PROCEDURE — 97112 NEUROMUSCULAR REEDUCATION: CPT | Performed by: PHYSICAL THERAPIST

## 2020-06-02 NOTE — TELEPHONE ENCOUNTER
Patient called back for clarification about why some of his future PT appointments had been cancelled. I explained that the visits were not approved so they were cancelled. A lady then interrupted me on the phone saying she had a question. I asked who she was and she stated she was his [facility] . She asked why we cancelled approved visits. I explained he scheduled those visits with expectation of Dr. Tyrell Wynn requesting & getting approval for additional visits but they were not approved so the appointments were cancelled. Patient then stated that really it was because his therapist, Marian Hsieh, wanted him to have an evaluation/exam of some sort. I advised I couldn't speak about what she had told him since I didn't hear that conversation but I could let him speak with her now. Electronically signed by Shaista Nunes MA on 6/2/2020 at 4:35 PM    I spoke with both the  and patient. I explained that we did not have approval for anymore visits, so we could not schedule anymore at this time. Explained to patient that I have discussed with the MD about having a FCE and the MD office would be trying to get approval. Discussed having one more approved visit at this time.      Marian Hsieh, PT, DPT

## 2020-06-02 NOTE — FLOWSHEET NOTE
[facility], [address] [place], [address]  Phone: [phone]   Fax: [phone]      Physical Therapy Treatment Note/ Progress Report:       Date:  2020    Patient Name:  Mirna Sierra    :  1970  MRN: [id number]  Restrictions/Precautions:    Medical/Treatment Diagnosis Information:  Diagnosis: Strain of muscle, fascia and tendon of other parts of biceps, right arm - S46.211, Superior glenoid labrum lesion of right shoulder - S43.431  Treatment Diagnosis: decreased ROM, strength, endurance, and high-level IADLs  Insurance/Certification information:  PT Insurance Information: [facility]  Physician Information:  Referring Practitioner: Dr. Rosalino Gutierrez   Has the plan of care been signed (Y/N):        []  Yes  [x]  No     Date of Patient follow up with Physician:  (see workers comp MD on )      Is this a Progress Report:     [x]  Yes  []  No      Progress report/ Recertification will be due (10 Rx or 30 days whichever is less): 2020      Visit # Insurance Allowable Auth Required   11 12 []  Yes []  No        Functional Scale:   UEFI  3/25 - 69% limitation    - 63% limitation   - 63% limitation          Latex Allergy:  [x]NO      []YES  Preferred Language for Healthcare:   [x]English       []other:      Pain level: 0 /10     SUBJECTIVE:  Pt states he has no pain today, but still has numbness in his hand about once a week.         OBJECTIVE:    Observation:    Test measurements:            ROM AROM  Comment     R L     Flexion 180 180     Abduction 164 182     ER 95 99     IR 61 68     Other(cervical)               Strength L R Comment   Flexion 4/5 4/5      Abduction 4/5 4-/5 + pain      ER 4+/5 4-/5 + pain      IR 4+/5 4-/5        Special Tests Results/Comment   Cohn-Alejandro pos   Neers neg   Speeds     OBriens     Other            Palpation: TTP - no TTP      Functional Mobility/Transfers:   Push strength - 28.8lbs, 30.5lbs, 24.7 lbs      Posture: rounded shoulders      Gait: (include devices/WB status): WNL      RESTRICTIONS/PRECAUTIONS: none    Exercises/Interventions:     Therapeutic Ex (11036) Sets/sec Reps Notes/CUES   Bike 10'  5 min overhand, 5 min underhand - Added 3/30   Pendulum hang            Wall walks - flex and ABD 10 sec 10 Added 3/25   ER cane - supine Added 3/25   IR towel Added 3/25         Scapular retraction - prone  3 10 15#, ^5/28   Shoulder ext - prone 3 10 12# ^5/28   Shoulder ER - sidelying  3 10 12# ^6/2   Shoulder IR- sidelying 3 10 12# ^5/28         Biceps 3 10 12#, ^5/28   Triceps  3 10 10#, ^5/12   shrugs 3 10 12#, ^5/28   Serratus 3 10 12#, ^5/28            Prone T 3 10 4#, ^6/2   Prone Y 3 10 4#, ^6/2               NMR re-education (44093)   CUES NEEDED   Shoulder Scaption - standing 3 10 3#,^5/28   Shoulder ABD            Lat pulldowns            Ball on the wall  3 10 2lb, 4-way, ^4/28   Shoulder PNF  3 10 4#, ^5/12               Body blade 10 sec 10 each IR/ER at 90 deg, flex 90 deg, Added 5/5         Therapeutic Activity (40485)      Table push-ups 3 10 ^5/28   Plank arm walks 1 10 ^5/28   Table to table box lifts 30#, Added 5/12   Box lift to above head  15#, ^5/28   Cone stacking at above head height Added 5/28   Weight turn - biceps curl with rotation - to mimic what he does at work 3 10 25#, Added 6/2         Therapeutic Exercise and NMR EXR  [x] (23506) Provided verbal/tactile cueing for activities related to strengthening, flexibility, endurance, ROM  for improvements in scapular, scapulothoracic and UE control with self care, reaching, carrying, lifting, house/yardwork, driving/computer work.     [x] (15889) Provided verbal/tactile cueing for activities related to improving balance, coordination, kinesthetic sense, posture, motor skill, proprioception  to assist with  scapular, scapulothoracic and UE control with self care, reaching, carrying, lifting, house/yardwork, 1 (11:55 - 12:15, 20')   [] [facility] (01518)  [] ES(attended) (73924)      [] ES (un) (05098):         ASSESSMENT:        GOALS: 5/19  Patient stated goal: throw a ball and return to bowling    [x] Progressing: [] Met: [] Not Met: [] Adjusted    Therapist goals for Patient:   Short Term Goals: To be achieved in: 2 weeks  1. Independent in HEP and progression per patient tolerance, in order to prevent re-injury. [] Progressing: [x] Met: [] Not Met: [] Adjusted   2. Patient will have a decrease in pain to facilitate improvement in movement, function, and ADLs as indicated by Functional Deficits. [] Progressing: [x] Met: [] Not Met: [] Adjusted    Long Term Goals: To be achieved in: 6-8 weeks  1. Disability index score of 35% or less for the UEFI to assist with reaching prior level of function. [x] Progressing: [] Met: [] Not Met: [] Adjusted  2. Patient will demonstrate increased right shoulder flex and ABD AROM to greater than or equal to 180 deg, ER AROM to greater than or equal to 90 deg, and IR AROM to greater than or equal to 65 deg to allow for proper joint functioning as indicated by patients Functional Deficits. [] Progressing: [x] Partially Met: [] Not Met: [] Adjusted  3. Patient will demonstrate an increase in right shoulder (flex, ABD, IR, and ER) strength to 4+/5 to allow for proper functional mobility as indicated by patients Functional Deficits. [x] Progressing: [] Met: [] Not Met: [] Adjusted  4. Patient will return to ADLs without increased symptoms or restriction. [x] Progressing: [] Met: [] Not Met: [] Adjusted  5. Pt will return to work pain free. [x] Progressing: [] Met: [] Not Met: [] Adjusted         Overall Progression Towards Functional goals/ Treatment Progress Update:  [x] Patient is progressing as expected towards functional goals listed. [] Progression is slowed due to complexities/Impairments listed. [] Progression has been slowed due to co-morbidities.   [] Plan

## 2020-06-08 ENCOUNTER — APPOINTMENT (OUTPATIENT)
Dept: PHYSICAL THERAPY | Age: 50
End: 2020-06-08
Payer: COMMERCIAL

## 2020-06-08 ENCOUNTER — HOSPITAL ENCOUNTER (OUTPATIENT)
Dept: PHYSICAL THERAPY | Age: 50
Setting detail: THERAPIES SERIES
Discharge: HOME | End: 2020-06-08
Payer: COMMERCIAL

## 2020-06-08 PROCEDURE — 97110 THERAPEUTIC EXERCISES: CPT | Performed by: PHYSICAL THERAPIST

## 2020-06-08 PROCEDURE — 97112 NEUROMUSCULAR REEDUCATION: CPT | Performed by: PHYSICAL THERAPIST

## 2020-06-08 PROCEDURE — 97530 THERAPEUTIC ACTIVITIES: CPT | Performed by: PHYSICAL THERAPIST

## 2020-06-08 NOTE — DISCHARGE SUMMARY
[] Progressing: [x] Met: [] Not Met: [] Adjusted   2. Patient will have a decrease in pain to facilitate improvement in movement, function, and ADLs as indicated by Functional Deficits. [] Progressing: [x] Met: [] Not Met: [] Adjusted    Long Term Goals: To be achieved in: 6-8 weeks  1. Disability index score of 35% or less for the UEFI to assist with reaching prior level of function. [x] Progressing: [] Met: [] Not Met: [] Adjusted  2. Patient will demonstrate increased right shoulder flex and ABD AROM to greater than or equal to 180 deg, ER AROM to greater than or equal to 90 deg, and IR AROM to greater than or equal to 65 deg to allow for proper joint functioning as indicated by patients Functional Deficits. [] Progressing: [x] Partially Met - except shoulder IR [] Not Met: [] Adjusted  3. Patient will demonstrate an increase in right shoulder (flex, ABD, IR, and ER) strength to 4+/5 to allow for proper functional mobility as indicated by patients Functional Deficits. [x] Progressing: [] Met: [] Not Met: [] Adjusted  4. Patient will return to ADLs without increased symptoms or restriction. [x] Progressing: [] Met: [] Not Met: [] Adjusted  5. Pt will return to work pain free. [x] Progressing: [] Met: [] Not Met: [] Adjusted         Overall Progression Towards Functional goals/ Treatment Progress Update:  [x] Patient is progressing as expected towards functional goals listed. [] Progression is slowed due to complexities/Impairments listed. [] Progression has been slowed due to co-morbidities.   [] Plan just implemented, too soon to assess goals progression <30days   [] Goals require adjustment due to lack of progress  [] Patient is not progressing as expected and requires additional follow up with physician  [] Other    Prognosis for POC: [x] Good [] Fair  [] Poor      Patient requires continued skilled intervention: [x] Yes  [] No      Treatment/Activity Tolerance:  [x] Patient tolerated treatment well [] Patient limited by fatique  [] Patient limited by pain  [] Patient limited by other medical complications  [] Other: Pt demonstrates full shoulder flex, ABD, and ER, but continues to lack shoulder IR ROM. He demonstrates improved shoulder strength, no longer having pain with resistance. He demonstrates improved push strength, as well. He is able to mimic his work task with 25lbs, but not the 35 - 50lbs required for the job at this time. After discussing with the referring MD, recommend pt for an FCE in order to safely return to his job. Therefore, is being D/C from PT at this time. He was educated to call with any questions and concerns. 6/8    Patient education: Patient education on PT and plan of care including diagnosis, prognosis, treatment goals and options. Patient agrees with discussed POC and treatment and is aware of rehab process. 3/25     PLAN: D/C to HEP   [] Continue per plan of care [] Alter current plan (see comments above)  [] Plan of care initiated [] Hold pending MD visit [x] Discharge      Electronically signed by:  Mirella Falk, PT, DPT     Note: If patient does not return for scheduled/ recommended follow up visits, this note will serve as a discharge from care along with most recent update on progress.

## 2020-06-15 ENCOUNTER — APPOINTMENT (OUTPATIENT)
Dept: PHYSICAL THERAPY | Age: 50
End: 2020-06-15
Payer: COMMERCIAL

## 2020-06-22 ENCOUNTER — APPOINTMENT (OUTPATIENT)
Dept: PHYSICAL THERAPY | Age: 50
End: 2020-06-22
Payer: COMMERCIAL

## 2020-06-29 ENCOUNTER — APPOINTMENT (OUTPATIENT)
Dept: PHYSICAL THERAPY | Age: 50
End: 2020-06-29
Payer: COMMERCIAL

## 2020-07-06 ENCOUNTER — OFFICE VISIT (OUTPATIENT)
Dept: ORTHOPEDIC SURGERY | Age: 50
End: 2020-07-06
Payer: COMMERCIAL

## 2020-07-06 VITALS — TEMPERATURE: 97.2 F | WEIGHT: 202 LBS | HEIGHT: 70 IN | BODY MASS INDEX: 28.92 KG/M2

## 2020-07-06 PROCEDURE — 99213 OFFICE O/P EST LOW 20 MIN: CPT | Performed by: ORTHOPAEDIC SURGERY

## 2020-07-06 NOTE — PROGRESS NOTES
History:  Solo Allan is here for follow up after right arm proximal biceps exploration. Surgery date was 8/30/19. There was no significant proximal biceps tendon left for revision tenodesis  The patient's pain is rated at 0/10. He finished PT at [facility]. He has not had FCE in order to determine what he can do at work. Physical Examination:  Temperature 97.2 °F (36.2 °C), temperature source Infrared, height 5' 10\" (1.778 m), weight 202 lb (91.6 kg). Patient is awake, alert, and in no acute distress. Sensation intact to light touch bilateral hands. EPL / FPL / Interossei intact bilateral.  Bilateral hands warm and well-perfused. Right shoulder active forward flexion 180, passive 180, ER 80, IR L4   Left shoulder active forward flexion 180, ER 80, IR L4   Right shoulder 5/5 Supraspinatus,  external rotation and internal rotation. Left shoulder 5/5 Supraspinatus, External rotation, Internal rotation   5/5 elbow flexion and wrist supination. No obvious Andrea deformity      Assessment:   10 1/2 status post right arm proximal biceps exploration      Plan: Today, he looks amazingly better than I have ever seen him in the past 3 years. He is not complaining of significant pain. He has full ROM and good strength. Ice / heat prn. NSAIDs prn. He needs to contact  to see if FCE has been approved by [facility]. We submitted C9 on 6/1/20. Once I get the results from that I can give him updated Medco. Will keep medco unchanged until FCE. Follow up prn. I can just update the medco and after receiving FCE results.

## 2020-07-08 ENCOUNTER — TELEPHONE (OUTPATIENT)
Dept: ORTHOPEDIC SURGERY | Age: 50
End: 2020-07-08

## 2020-07-08 NOTE — TELEPHONE ENCOUNTER
Law office called needing [facility] 14 and C9 faxed to them.  Fax# [phone]

## 2020-07-08 NOTE — TELEPHONE ENCOUNTER
Please advise re:estimated RTW. Previous Medco only covers until 7/14/2020, current Medco has no date on it.

## 2020-07-08 NOTE — TELEPHONE ENCOUNTER
Can redo the Medco and put the and of the year. The date doesn't really matter, as I told the patient in the office that it is all depending on him having the FCE. I also told him once I receive results of FCE, we can just do an updated medco returning him to work and he does not need to come back in to office.

## 2020-07-09 ENCOUNTER — TELEPHONE (OUTPATIENT)
Dept: ORTHOPEDIC SURGERY | Age: 50
End: 2020-07-09

## 2020-07-09 NOTE — TELEPHONE ENCOUNTER
[facility]; req [address] needs completed for RTW. Please advise.      Call [phone]       [phone]

## 2020-10-27 ENCOUNTER — OFFICE VISIT (OUTPATIENT)
Dept: ORTHOPEDIC SURGERY | Age: 50
End: 2020-10-27
Payer: COMMERCIAL

## 2020-10-27 VITALS — WEIGHT: 202 LBS | TEMPERATURE: 97.9 F | RESPIRATION RATE: 16 BRPM | BODY MASS INDEX: 28.92 KG/M2 | HEIGHT: 70 IN

## 2020-10-27 PROCEDURE — 99213 OFFICE O/P EST LOW 20 MIN: CPT | Performed by: ORTHOPAEDIC SURGERY

## 2020-10-27 NOTE — PROGRESS NOTES
History:  Joby Murphy is here for follow up after right arm proximal biceps exploration. Surgery date was 8/30/19. There was no significant proximal biceps tendon left for revision tenodesis  The patient's pain is rated at 0/10. He finished PT at [facility] office. He has had FCE in order to determine what he can do at work. They recommend vocational rehab and transition to [employer] job instead of [employer]. 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 Physical Examination:  Temperature 97.9 °F (36.6 °C), resp. rate 16, height 5' 10\" (1.778 m), weight 202 lb (91.6 kg). Patient is awake, alert, and in no acute distress. Sensation intact to light touch bilateral hands. EPL / FPL / Interossei intact bilateral.  Bilateral hands warm and well-perfused. Right shoulder active forward flexion 180, passive 180, ER 80, IR L4   Left shoulder active forward flexion 180, ER 80, IR L4   Right shoulder 5-/5 Supraspinatus, 5/5 external rotation and internal rotation. Left shoulder 5/5 Supraspinatus, External rotation, Internal rotation   5/5 elbow flexion and wrist supination. No obvious Andrea deformity      Assessment:   14 months status post right arm proximal biceps exploration      Plan: Today, he has some mild rotator cuff weakness, that is new since last visit. Ice / heat prn. NSAIDs prn. Will request PT, vocational rehab, and list him as able to do [employer] job as recommended on FCE. Follow up in 3 months.

## 2020-12-23 ENCOUNTER — TELEPHONE (OUTPATIENT)
Dept: ORTHOPEDIC SURGERY | Age: 50
End: 2020-12-23

## 2020-12-23 NOTE — TELEPHONE ENCOUNTER
Received a call from [address] [place] [address] and his . Last saw Dr. Dorene Shin 10/27/2020. [employer] 14 was completed then, since that appointment his Med[employer] 14 has  and his pay has been cut off. He wants to know if he can get a Medco 14 to cover him until his appointment on 2021?

## 2020-12-23 NOTE — TELEPHONE ENCOUNTER
Patient does not have appointment for follow up until 2/2/2021. Is it ok to extend Medco 14 until after his appointment?  Please advise

## 2020-12-23 NOTE — TELEPHONE ENCOUNTER
Medco completed and forwarded to [facility]

## 2021-09-14 ENCOUNTER — APPOINTMENT (OUTPATIENT)
Dept: CT IMAGING | Age: 51
End: 2021-09-14
Payer: COMMERCIAL

## 2021-09-14 ENCOUNTER — APPOINTMENT (OUTPATIENT)
Dept: GENERAL RADIOLOGY | Age: 51
End: 2021-09-14
Payer: COMMERCIAL

## 2021-09-14 ENCOUNTER — HOSPITAL ENCOUNTER (EMERGENCY)
Age: 51
Discharge: HOME | End: 2021-09-14
Payer: COMMERCIAL

## 2021-09-14 VITALS
RESPIRATION RATE: 14 BRPM | SYSTOLIC BLOOD PRESSURE: 107 MMHG | TEMPERATURE: 98.4 F | WEIGHT: 194 LBS | DIASTOLIC BLOOD PRESSURE: 68 MMHG | HEIGHT: 70 IN | BODY MASS INDEX: 27.77 KG/M2 | HEART RATE: 73 BPM | OXYGEN SATURATION: 94 %

## 2021-09-14 DIAGNOSIS — R55 SYNCOPE AND COLLAPSE: Primary | ICD-10-CM

## 2021-09-14 DIAGNOSIS — E86.0 DEHYDRATION: ICD-10-CM

## 2021-09-14 LAB
A/G RATIO: 1.6 (ref 1.1–2.2)
ALBUMIN SERPL-MCNC: 4.3 G/DL (ref 3.4–5)
ALP BLD-CCNC: 36 U/L (ref 40–129)
ALT SERPL-CCNC: 19 U/L (ref 10–40)
ANION GAP SERPL CALCULATED.3IONS-SCNC: 11 MMOL/L (ref 3–16)
ANION GAP SERPL CALCULATED.3IONS-SCNC: 15 MMOL/L (ref 3–16)
AST SERPL-CCNC: 25 U/L (ref 15–37)
BASOPHILS ABSOLUTE: 0 K/UL (ref 0–0.2)
BASOPHILS RELATIVE PERCENT: 0.6 %
BILIRUB SERPL-MCNC: 0.5 MG/DL (ref 0–1)
BUN BLDV-MCNC: 18 MG/DL (ref 7–20)
BUN BLDV-MCNC: 18 MG/DL (ref 7–20)
CALCIUM SERPL-MCNC: 9 MG/DL (ref 8.3–10.6)
CALCIUM SERPL-MCNC: 9.9 MG/DL (ref 8.3–10.6)
CHLORIDE BLD-SCNC: 100 MMOL/L (ref 99–110)
CHLORIDE BLD-SCNC: 104 MMOL/L (ref 99–110)
CO2: 24 MMOL/L (ref 21–32)
CO2: 24 MMOL/L (ref 21–32)
CREAT SERPL-MCNC: 1.3 MG/DL (ref 0.9–1.3)
CREAT SERPL-MCNC: 1.6 MG/DL (ref 0.9–1.3)
D DIMER: <200 NG/ML DDU (ref 0–229)
EOSINOPHILS ABSOLUTE: 0.1 K/UL (ref 0–0.6)
EOSINOPHILS RELATIVE PERCENT: 2.8 %
ETHANOL: NORMAL MG/DL (ref 0–0.08)
GFR AFRICAN AMERICAN: 55
GFR AFRICAN AMERICAN: >60
GFR NON-AFRICAN AMERICAN: 45
GFR NON-AFRICAN AMERICAN: 58
GLOBULIN: 2.7 G/DL
GLUCOSE BLD-MCNC: 130 MG/DL (ref 70–99)
GLUCOSE BLD-MCNC: 151 MG/DL (ref 70–99)
HCT VFR BLD CALC: 43.8 % (ref 40.5–52.5)
HEMOGLOBIN: 14.6 G/DL (ref 13.5–17.5)
LIPASE: 28 U/L (ref 13–60)
LYMPHOCYTES ABSOLUTE: 2 K/UL (ref 1–5.1)
LYMPHOCYTES RELATIVE PERCENT: 41 %
MCH RBC QN AUTO: 29.5 PG (ref 26–34)
MCHC RBC AUTO-ENTMCNC: 33.3 G/DL (ref 31–36)
MCV RBC AUTO: 88.5 FL (ref 80–100)
MONOCYTES ABSOLUTE: 0.4 K/UL (ref 0–1.3)
MONOCYTES RELATIVE PERCENT: 8.5 %
NEUTROPHILS ABSOLUTE: 2.2 K/UL (ref 1.7–7.7)
NEUTROPHILS RELATIVE PERCENT: 47.1 %
PDW BLD-RTO: 14 % (ref 12.4–15.4)
PLATELET # BLD: 217 K/UL (ref 135–450)
PMV BLD AUTO: 7.7 FL (ref 5–10.5)
POTASSIUM REFLEX MAGNESIUM: 3.8 MMOL/L (ref 3.5–5.1)
POTASSIUM REFLEX MAGNESIUM: 4.1 MMOL/L (ref 3.5–5.1)
PRO-BNP: 226 PG/ML (ref 0–124)
RBC # BLD: 4.95 M/UL (ref 4.2–5.9)
SODIUM BLD-SCNC: 139 MMOL/L (ref 136–145)
SODIUM BLD-SCNC: 139 MMOL/L (ref 136–145)
TOTAL PROTEIN: 7 G/DL (ref 6.4–8.2)
TROPONIN: <0.01 NG/ML
WBC # BLD: 4.8 K/UL (ref 4–11)

## 2021-09-14 PROCEDURE — 70450 CT HEAD/BRAIN W/O DYE: CPT

## 2021-09-14 PROCEDURE — 80053 COMPREHEN METABOLIC PANEL: CPT

## 2021-09-14 PROCEDURE — 96360 HYDRATION IV INFUSION INIT: CPT

## 2021-09-14 PROCEDURE — 71045 X-RAY EXAM CHEST 1 VIEW: CPT

## 2021-09-14 PROCEDURE — 82077 ASSAY SPEC XCP UR&BREATH IA: CPT

## 2021-09-14 PROCEDURE — 99283 EMERGENCY DEPT VISIT LOW MDM: CPT

## 2021-09-14 PROCEDURE — 83880 ASSAY OF NATRIURETIC PEPTIDE: CPT

## 2021-09-14 PROCEDURE — 85025 COMPLETE CBC W/AUTO DIFF WBC: CPT

## 2021-09-14 PROCEDURE — 36415 COLL VENOUS BLD VENIPUNCTURE: CPT

## 2021-09-14 PROCEDURE — 2580000003 HC RX 258: Performed by: NURSE PRACTITIONER

## 2021-09-14 PROCEDURE — 93005 ELECTROCARDIOGRAM TRACING: CPT | Performed by: NURSE PRACTITIONER

## 2021-09-14 PROCEDURE — 85379 FIBRIN DEGRADATION QUANT: CPT

## 2021-09-14 PROCEDURE — 83690 ASSAY OF LIPASE: CPT

## 2021-09-14 PROCEDURE — 84484 ASSAY OF TROPONIN QUANT: CPT

## 2021-09-14 RX ORDER — 0.9 % SODIUM CHLORIDE 0.9 %
2000 INTRAVENOUS SOLUTION INTRAVENOUS ONCE
Status: COMPLETED | OUTPATIENT
Start: 2021-09-14 | End: 2021-09-14

## 2021-09-14 RX ADMIN — SODIUM CHLORIDE 2000 ML: 9 INJECTION, SOLUTION INTRAVENOUS at 19:19

## 2021-09-14 NOTE — ED PROVIDER NOTES
EKG interpretation    Normal sinus rhythm with frequent PVC nonspecific T wave changes      Blanca Barriga MD  09/14/21 0780

## 2021-09-14 NOTE — ED PROVIDER NOTES
[address] [facility] [address]  Dept: [phone]  Loc: [address] ENCOUNTER        This patient was not seen or evaluated by the attending physician. I evaluated this patient, the attending physician was available for consultation. CHIEF COMPLAINT    Chief Complaint   Patient presents with    Loss of Consciousness     at mothers  and he thinks he got too hot  not eating and alcohol intake  Incontinent of stool  he states firemen would not let him up       HPI    Gina Alonzo is a 44 y.o. male who presents with a syncopal episode. Onset was just prior to arrival.  Patient was on his mother's , states that he has not eaten all day, was out in the sun, and drinking alcohol. He states that he all of a sudden when he was standing up talking to some individuals felt hot flushed lightheaded like he was getting passed out. He states that he did syncopized. He states that he does have a headache in the back of his head and was told by his wife that he did hit his head on the concrete. Is not on anticoagulants. States that the syncopal episode was very brief in nature, only a few seconds. No seizure-like activity. Denies any history of seizures. States that \"I feel great right now. \"  No post injury chest pain or shortness of breath, and denies any symptoms currently. States that he is passed out before in the past from overheating and this was a similar situation to that. Patient came to the ED for further evaluation and treatment via EMS. REVIEW OF SYSTEMS    Cardiac: no palpitations, no chest pain  Respiratory: no SOB, no new cough  Neurologic: see HPI, + posterior headache, no double vision  GI: no vomiting, no diarrhea  Hematologic: no hematochezia, no hemoptysis  General: no fever, no chills  Musculoskeletal: see HPI  All other systems reviewed and are negative. Ceeada Stands      PAST Financial Resource Strain:     Difficulty of Paying Living Expenses:    Food Insecurity:     Worried About Running Out of Food in the Last Year:     [address] in the Last Year:    Transportation Needs:     Lack of Transportation (Medical):  Lack of Transportation (Non-Medical):    Physical Activity:     Days of Exercise per Week:     Minutes of Exercise per Session:    Stress:     Feeling of Stress :    Social Connections:     Frequency of Communication with Friends and Family:     Frequency of Social Gatherings with Friends and Family:     Attends [employer]oration[employer] Services:     Active Member of Clubs or Organizations:     Attends Club or Organization Meetings:     Marital Status:    Intimate Partner Violence:     Fear of Current or Ex-Partner:     Emotionally Abused:     Physically Abused:     Sexually Abused:      History reviewed. No pertinent family history.     PHYSICAL EXAM    VITAL SIGNS: /68   Pulse 73   Temp 98.4 °F (36.9 °C)   Resp 14   Ht 5' 10\" (1.778 m)   Wt 194 lb (88 kg)   SpO2 94%   BMI 27.84 kg/m²    Constitutional:  Well developed, well nourished, no acute distress  Eyes: Pupils equally round and reactive to light, sclera nonicteric  HENT:  Atraumatic, see integument, moist mucus membranes, no hemotympanum bilaterally, no stiles signs or raccoon eyes  Neck: supple, no JVD, no posterior neck tenderness  Respiratory:  Lungs clear to auscultation bilaterally, no retractions   Cardiovascular:  Regular rate, no murmurs  GI:  Soft, nontender, normal bowel sounds  Musculoskeletal:  No edema, no acute deformities  Integument:  Skin warm and dry, no petechiae, intact, no hematoma  Neurologic: Awake, alert, oriented x4, no aphasia, no slurred speech (appears clinically sober), CN II-XII intact, normal finger to nose test bilaterally, 5/5 strength in all 4 extremities, sensation to light touch intact bilaterally, patellar reflexes 2+ and equal bilaterally, no ataxic gait  Vascular: Radial and DP pulses 2+ and equal bilaterally  Psychiatric: Cooperative, pleasant affect         EKG   Please see the physician's note for EKG interpretation. RADIOLOGY  CT Head WO Contrast   Final Result   No acute intracranial abnormality. Paranasal sinus disease. XR CHEST PORTABLE   Final Result   No acute process.            LABS  Results for orders placed or performed during the hospital encounter of 09/14/21   CBC Auto Differential   Result Value Ref Range    WBC 4.8 4.0 - 11.0 K/uL    RBC 4.95 4.20 - 5.90 M/uL    Hemoglobin 14.6 13.5 - 17.5 g/dL    Hematocrit 43.8 40.5 - 52.5 %    MCV 88.5 80.0 - 100.0 fL    MCH 29.5 26.0 - 34.0 pg    MCHC 33.3 31.0 - 36.0 g/dL    RDW 14.0 12.4 - 15.4 %    Platelets 176 944 - 572 K/uL    MPV 7.7 5.0 - 10.5 fL    Neutrophils % 47.1 %    Lymphocytes % 41.0 %    Monocytes % 8.5 %    Eosinophils % 2.8 %    Basophils % 0.6 %    Neutrophils Absolute 2.2 1.7 - 7.7 K/uL    Lymphocytes Absolute 2.0 1.0 - 5.1 K/uL    Monocytes Absolute 0.4 0.0 - 1.3 K/uL    Eosinophils Absolute 0.1 0.0 - 0.6 K/uL    Basophils Absolute 0.0 0.0 - 0.2 K/uL   Comprehensive Metabolic Panel w/ Reflex to MG   Result Value Ref Range    Sodium 139 136 - 145 mmol/L    Potassium reflex Magnesium 4.1 3.5 - 5.1 mmol/L    Chloride 100 99 - 110 mmol/L    CO2 24 21 - 32 mmol/L    Anion Gap 15 3 - 16    Glucose 151 (H) 70 - 99 mg/dL    BUN 18 7 - 20 mg/dL    CREATININE 1.6 (H) 0.9 - 1.3 mg/dL    GFR Non-African American 45 (A) >60    GFR  55 (A) >60    Calcium 9.9 8.3 - 10.6 mg/dL    Total Protein 7.0 6.4 - 8.2 g/dL    Albumin 4.3 3.4 - 5.0 g/dL    Albumin/Globulin Ratio 1.6 1.1 - 2.2    Total Bilirubin 0.5 0.0 - 1.0 mg/dL    Alkaline Phosphatase 36 (L) 40 - 129 U/L    ALT 19 10 - 40 U/L    AST 25 15 - 37 U/L    Globulin 2.7 g/dL   Lipase   Result Value Ref Range    Lipase 28.0 13.0 - 60.0 U/L   Troponin   Result Value Ref Range    Troponin <0.01 <0.01 ng/mL   Brain Natriuretic Peptide   Result Value Ref Range    Pro- (H) 0 - 124 pg/mL   D-Dimer, Quantitative   Result Value Ref Range    D-Dimer, Quant <200 0 - 229 ng/mL DDU   Ethanol   Result Value Ref Range    Ethanol Lvl None Detected mg/dL   Basic Metabolic Panel w/ Reflex to MG   Result Value Ref Range    Sodium 139 136 - 145 mmol/L    Potassium reflex Magnesium 3.8 3.5 - 5.1 mmol/L    Chloride 104 99 - 110 mmol/L    CO2 24 21 - 32 mmol/L    Anion Gap 11 3 - 16    Glucose 130 (H) 70 - 99 mg/dL    BUN 18 7 - 20 mg/dL    CREATININE 1.3 0.9 - 1.3 mg/dL    GFR Non-African American 58 (A) >60    GFR African American >60 >60    Calcium 9.0 8.3 - 10.6 mg/dL             ED COURSE & MEDICAL DECISION MAKING    Pertinent Labs & Imaging studies reviewed and interpreted. (See chart for details)  See chart for details of medications given during the ED stay. Vitals:    09/14/21 1825 09/14/21 1830 09/14/21 1845   BP: (!) 96/59 100/65 107/68   Pulse: 67  73   Resp: 14     Temp: 98.4 °F (36.9 °C)     SpO2: 94% 95% 94%   Weight: 194 lb (88 kg)     Height: 5' 10\" (1.778 m)         Differential Diagnosis: Cardiac arrhythmia, drop attack from a subarachnoid hemorrhage, sepsis, dehydration, vasovagal syncope, other    CRITICAL CARE NOTE:  There was a high probability of clinically significant life-threatening deterioration of the patient's condition requiring my urgent intervention. Total critical care time was at least 20 minutes. This includes vital sign monitoring, pulse oximetry monitoring, telemetry monitoring, clinical response to the IV medications, reviewing the nursing notes, consultation time, dictation/documentation time, and interpretation of the labwork. This excludes any separately billable procedures performed.         Cardiac Monitor Strip Interpretation  Interpreted by me  Monitor strip interpreted:  Rhythm: normal sinus   Rate: normal  Ectopy: premature ventricular contractions (unifocal) infrequent      Patient is afebrile and nontoxic in appearance. Does appear clinically sober although he does admit to drinking alcohol today as he was at his mother's .      Labs reveal no leukocytosis or anemia. Metabolic panel shows no severe electrolyte derangements. He does seem dehydrated with a slight VIOLETTE. BUN is 18 with a creatinine of 1.6 and a GFR of 55. This was repeated after 2 L of IV fluids, and is corrected and much improved. Creatinine is 1.3 with a BUN of 18 and a GFR of greater than 60. Again no severe electrolyte derangements on the repeat BMP. Lipase WNL. CXR findings as above. CT findings as above. EKG interpreted by ED physician. Troponin negative. ddimer negative. Reevaluation: Patient is resting comfortably. States that he is still asymptomatic. Wants to be discharged home. Given that he had a normalization of his slight VIOLETTE with fluids, and he is to continue to orally rehydrate while at home I do believe that he is stable for discharge home. Patient is in the low-risk group for serious outcome based on the [facility] Syncope Rule, as patient does not have a history of CHF, hematocrit is above 30%, EKG is normal, patient is not complaining of shortness of breath, and patient's systolic blood pressure was above 90 mmHg at triage. Patient is in the low-risk group for 30-day serious adverse event based on the [place] Syncope Risk Score, as the patient does not have a history of heart disease (CAD, a-fib, a-flutter, CHF, valvular disease), systolic blood pressure was not <90 or >180 on any reading, troponin was not elevated, there is no abnormal QRS axis (<-30 degrees or >100 degrees), QRS duration is not >130 ms, QTc is not >480 ms, and clinical impression was not consistent with cardiac syncope (no sudden syncope, lack of prodrome, preceding palpitations, or injuries suggesting sudden fall). I have a low index of suspicion for any seizure. He had prodromal symptoms.   There was no tonic-clonic like movement. And he had no postictal period. I will give him referral for neurology for follow-up as requested. He is to return immediately for any new or worsening symptoms. He is in agreement with this plan as well as plan of discharge and will be discharged home in stable condition. I estimate there is LOW risk for SUBARACHNOID HEMORRHAGE, MENINGITIS, INTRACRANIAL HEMORRHAGE, SUBDURAL HEMATOMA, OR STROKE, thus I consider the discharge disposition reasonable. Shar Nguyễn and I have discussed the diagnosis and risks, and we agree with discharging home to follow-up with their primary doctor. We also discussed returning to the Emergency Department immediately if new or worsening symptoms occur. We have discussed the symptoms which are most concerning (e.g., changing or worsening pain, weakness, vomiting, fever) that necessitate immediate return. Discharge Vital Signs:  Blood pressure 107/68, pulse 73, temperature 98.4 °F (36.9 °C), resp. rate 14, height 5' 10\" (1.778 m), weight 194 lb (88 kg), SpO2 94 %. I instructed the patient to follow up as an outpatient in 1-3 day. I instructed the patient to return to the ED immediately for any new or worsening symptoms. The patient verbalizes understanding. FINAL IMPRESSION    1. Syncope and collapse    2.  Dehydration        PLAN  Discharge home with close outpatient follow up with PCP and neurology      (Please note that this note was completed with a voice recognition program.  Every attempt was made to edit the dictations, but inevitably there remain words that are mis-transcribed.)        Rojelio Sheppard, BLAYNE - KOBY  09/14/21 [id number]

## 2021-09-15 LAB
EKG ATRIAL RATE: 70 BPM
EKG DIAGNOSIS: NORMAL
EKG P AXIS: 55 DEGREES
EKG P-R INTERVAL: 154 MS
EKG Q-T INTERVAL: 362 MS
EKG QRS DURATION: 88 MS
EKG QTC CALCULATION (BAZETT): 427 MS
EKG R AXIS: -4 DEGREES
EKG T AXIS: 27 DEGREES
EKG VENTRICULAR RATE: 84 BPM

## 2021-09-15 PROCEDURE — 93010 ELECTROCARDIOGRAM REPORT: CPT | Performed by: INTERNAL MEDICINE

## 2021-09-15 NOTE — ED NOTES
Discharge instructions reviewed with pt and pt denied having any questions. Discharge paperwork signed and pt discharged.         Sadi Sainz RN  09/14/21 9305

## 2022-03-02 NOTE — TELEPHONE ENCOUNTER
Pt would like to get a refill of his percocet. [facility]. Last filled 11/16/18  Quantity 60    Please advise. normal appearance , without tenderness upon palpation , no deformities , trachea midline , Thyroid normal size , no thyroid nodules , no masses , no JVD , thyroid nontender

## 2023-07-07 ENCOUNTER — HOSPITAL ENCOUNTER (EMERGENCY)
Age: 53
Discharge: HOME | End: 2023-07-07
Attending: EMERGENCY MEDICINE
Payer: COMMERCIAL

## 2023-07-07 VITALS
HEIGHT: 70 IN | TEMPERATURE: 98.7 F | RESPIRATION RATE: 17 BRPM | OXYGEN SATURATION: 96 % | HEART RATE: 71 BPM | WEIGHT: 187.17 LBS | DIASTOLIC BLOOD PRESSURE: 79 MMHG | BODY MASS INDEX: 26.8 KG/M2 | SYSTOLIC BLOOD PRESSURE: 135 MMHG

## 2023-07-07 DIAGNOSIS — N48.89 PENILE PAPULES: Primary | ICD-10-CM

## 2023-07-07 PROCEDURE — 99282 EMERGENCY DEPT VISIT SF MDM: CPT

## 2023-07-07 ASSESSMENT — ENCOUNTER SYMPTOMS
NAUSEA: 0
ABDOMINAL PAIN: 0
GASTROINTESTINAL NEGATIVE: 1
DIARRHEA: 0
VOMITING: 0

## 2023-07-07 ASSESSMENT — PAIN - FUNCTIONAL ASSESSMENT: PAIN_FUNCTIONAL_ASSESSMENT: NONE - DENIES PAIN

## 2023-07-07 NOTE — ED NOTES
Discharge and education instructions reviewed. Patient verbalized understanding, teach-back successful. Patient denied questions at this time. No acute distress noted. Patient instructed to follow-up as noted - return to emergency department if symptoms worsen. Patient verbalized understanding. Discharged per EDMD with discharge instructions.        Ann Vee RN  07/07/23 [id number]

## 2023-07-07 NOTE — ED PROVIDER NOTES
information and clinical assessment, I estimate the risk of hospitalization to be greater than risk of treatment at home. I have explained to the patient that the risk could rapidly change, given precautions for return and instructions. Explained to patient that the risk for mortality is low based on demographic, history and clinical factors. I discussed with patient the results of evaluation in the ED, diagnosis, care, and prognosis. The plan is to discharge to home. Patient is in agreement with plan and questions have been answered. I also discussed with patient the reasons which may require a return visit and the importance of follow-up care. The patient is well-appearing, nontoxic, and improved at the time of discharge. Patient agrees to call to arrange follow-up care as directed. Patient understands to return immediately for worsening/change in symptoms. I am the Primary Clinician of Record. I PERSONALLY SAW THE PATIENT AND PERFORMED A SUBSTANTIVE PORTION OF THE VISIT INCLUDING ALL ASPECTS OF THE MEDICAL DECISION MAKING PROCESS. FINAL IMPRESSION    No diagnosis found. DISPOSITION/PLAN     DISPOSITION        PATIENT REFERRED TO:  No follow-up provider specified.     DISCHARGE MEDICATIONS:  New Prescriptions    No medications on file       DISCONTINUED MEDICATIONS:  Discontinued Medications    No medications on file              (Please note that portions of this note were completed with a voice recognition program.  Efforts were made to edit the dictations but occasionally words are mis-transcribed.)    Alin Trevizo MD (electronically signed)            Alin Trevizo MD  07/07/23 2584

## 2023-07-10 ASSESSMENT — ENCOUNTER SYMPTOMS
ABDOMINAL PAIN: 0
VOMITING: 0
GASTROINTESTINAL NEGATIVE: 1
NAUSEA: 0
DIARRHEA: 0

## 2023-12-17 ENCOUNTER — OFFICE VISIT (OUTPATIENT)
Age: 53
End: 2023-12-17

## 2023-12-17 VITALS
WEIGHT: 187 LBS | OXYGEN SATURATION: 96 % | HEIGHT: 70 IN | SYSTOLIC BLOOD PRESSURE: 113 MMHG | RESPIRATION RATE: 16 BRPM | DIASTOLIC BLOOD PRESSURE: 72 MMHG | TEMPERATURE: 98.6 F | BODY MASS INDEX: 26.77 KG/M2 | HEART RATE: 75 BPM

## 2023-12-17 DIAGNOSIS — I10 HYPERTENSION, UNSPECIFIED TYPE: Primary | ICD-10-CM

## 2023-12-17 DIAGNOSIS — E11.9 TYPE 2 DIABETES MELLITUS WITHOUT COMPLICATION, WITHOUT LONG-TERM CURRENT USE OF INSULIN (HCC): ICD-10-CM

## 2023-12-17 DIAGNOSIS — E78.00 HIGH CHOLESTEROL: ICD-10-CM

## 2023-12-17 DIAGNOSIS — Z76.0 ENCOUNTER FOR MEDICATION REFILL: ICD-10-CM

## 2023-12-17 DIAGNOSIS — N52.1 ERECTILE DYSFUNCTION DUE TO DISEASES CLASSIFIED ELSEWHERE: ICD-10-CM

## 2023-12-17 RX ORDER — SILDENAFIL 50 MG/1
50 TABLET, FILM COATED ORAL DAILY PRN
Qty: 30 TABLET | Refills: 0 | Status: SHIPPED | OUTPATIENT
Start: 2023-12-17 | End: 2024-01-16

## 2023-12-17 RX ORDER — LISINOPRIL 2.5 MG/1
2.5 TABLET ORAL DAILY
Qty: 30 TABLET | Refills: 0 | Status: SHIPPED | OUTPATIENT
Start: 2023-12-17 | End: 2024-01-16

## 2023-12-17 RX ORDER — ATORVASTATIN CALCIUM 20 MG/1
20 TABLET, FILM COATED ORAL DAILY
Qty: 30 TABLET | Refills: 0 | Status: SHIPPED | OUTPATIENT
Start: 2023-12-17 | End: 2024-01-16

## 2023-12-17 RX ORDER — FENOFIBRATE 145 MG/1
145 TABLET, COATED ORAL DAILY
Qty: 30 TABLET | Refills: 0 | Status: SHIPPED | OUTPATIENT
Start: 2023-12-17 | End: 2024-01-16

## 2024-01-18 ENCOUNTER — OFFICE VISIT (OUTPATIENT)
Age: 54
End: 2024-01-18

## 2024-01-18 VITALS
TEMPERATURE: 98.6 F | HEART RATE: 76 BPM | BODY MASS INDEX: 26.69 KG/M2 | WEIGHT: 186 LBS | OXYGEN SATURATION: 95 % | SYSTOLIC BLOOD PRESSURE: 119 MMHG | DIASTOLIC BLOOD PRESSURE: 77 MMHG

## 2024-01-18 DIAGNOSIS — I10 PRIMARY HYPERTENSION: Primary | ICD-10-CM

## 2024-01-18 DIAGNOSIS — Z76.0 ENCOUNTER FOR MEDICATION REFILL: ICD-10-CM

## 2024-01-18 DIAGNOSIS — E78.00 HIGH CHOLESTEROL: ICD-10-CM

## 2024-01-18 DIAGNOSIS — N52.9 ERECTILE DYSFUNCTION, UNSPECIFIED ERECTILE DYSFUNCTION TYPE: ICD-10-CM

## 2024-01-18 RX ORDER — ATORVASTATIN CALCIUM 20 MG/1
20 TABLET, FILM COATED ORAL DAILY
Qty: 30 TABLET | Refills: 0 | Status: SHIPPED | OUTPATIENT
Start: 2024-01-18 | End: 2024-02-17

## 2024-01-18 RX ORDER — SILDENAFIL 50 MG/1
50 TABLET, FILM COATED ORAL DAILY PRN
Qty: 30 TABLET | Refills: 0 | Status: SHIPPED | OUTPATIENT
Start: 2024-01-18 | End: 2024-02-17

## 2024-01-18 RX ORDER — LISINOPRIL 2.5 MG/1
2.5 TABLET ORAL DAILY
Qty: 30 TABLET | Refills: 0 | Status: SHIPPED | OUTPATIENT
Start: 2024-01-18 | End: 2024-02-17

## 2024-01-19 ENCOUNTER — OFFICE VISIT (OUTPATIENT)
Age: 54
End: 2024-01-19

## 2024-01-19 VITALS
WEIGHT: 186 LBS | BODY MASS INDEX: 26.63 KG/M2 | RESPIRATION RATE: 16 BRPM | HEIGHT: 70 IN | DIASTOLIC BLOOD PRESSURE: 73 MMHG | OXYGEN SATURATION: 94 % | HEART RATE: 83 BPM | SYSTOLIC BLOOD PRESSURE: 106 MMHG | TEMPERATURE: 98.7 F

## 2024-01-19 DIAGNOSIS — Z01.818 PRE-OPERATIVE EXAM: Primary | ICD-10-CM

## 2024-01-19 NOTE — PROGRESS NOTES
Ruslan Jones (:  1970) is a 53 y.o. male, here for evaluation of the following chief complaint(s):  Diabetes (Pt here for medication refill.)      ASSESSMENT/PLAN:  Visit Diagnoses and Associated Orders       Primary hypertension    -  Primary         Encounter for medication refill             Erectile dysfunction, unspecified erectile dysfunction type             High cholesterol             ORDERS WITHOUT AN ASSOCIATED DIAGNOSIS    sildenafil (VIAGRA) 50 MG tablet [[id number]]      lisinopril (PRINIVIL;ZESTRIL) 2.5 MG tablet [[id number]]      atorvastatin (LIPITOR) 20 MG tablet [[id number]]               Summary    - medications refilled.   - I explained to him that if he can get in with his PCP sooner, that he would likely have to pay less to have the pre-op testing done which would include EKG, CBC with diff, A1c, CMP, and possibly a CXR since he has diabetes and high blood pressure. He is going to look into this and if he can't get an appointment sooner, he'll come back and have his pre-op done here.       SUBJECTIVE/OBJECTIVE:  JAMES Mercer presents to the clinic for medication refills.  He has an appointment for his new PCP in about 2 weeks but he has run out of medications.  He is going to have surgery on 2024 for avascular necrosis of the right hip.  He is further requesting a pre-op physical today.     Vitals:    24   BP: 119/77   Pulse: 76   Temp: 98.6 °F (37 °C)   TempSrc: Oral   SpO2: 95%   Weight: 84.4 kg (186 lb)       No results found for this visit on 24.     No orders to display       Review of Systems      Physical Exam  Vitals reviewed.   Constitutional:       Appearance: He is normal weight.   HENT:      Head: Normocephalic.      Mouth/Throat:      Mouth: Mucous membranes are moist.   Eyes:      Pupils: Pupils are equal, round, and reactive to light.   Cardiovascular:      Rate and Rhythm: Normal rate and regular rhythm.      Heart sounds: Normal heart sounds, S1 normal and

## 2024-01-19 NOTE — PROGRESS NOTES
Ruslan Jones (:  1970) is a 53 y.o. male, here for evaluation of the following chief complaint(s):  Pre-op Exam (Pt is here for pre op physical, DOS: 2024 with Dr Fran Barahona for right hip socket surgery)      ASSESSMENT/PLAN:  Visit Diagnoses and Associated Orders       Pre-operative exam    -  Primary    Basic Metabolic Panel [[id number] Custom]   - Future Order    Renal Function Panel [[id number] Custom]   - Future Order    FRUCTOSAMINE [[id number] Custom]   - Future Order                  Summary    - Patient's exam was unremarkable save for the right hip.  Once we get the blood work back, which he plans to get done Tuesday while at the hospital, he should be cleared.  Physically he is in good health.    HPI    53 y.o. male presents for a physical for right hip . He denies any current symptoms of illness and any recent physical injuries.    He also denies any family history of cardiac issues, and denies history of palpitations, chest pain, or shortness of breath with exercise.    Vitals:    24 1653   BP: 106/73   Pulse: 83   Resp: 16   Temp: 98.7 °F (37.1 °C)   SpO2: 94%   Weight: 84.4 kg (186 lb)   Height: 1.778 m (5' 10\")       No results found for this visit on 24.     No orders to display       Review of Systems    Physical Exam  Vitals reviewed.   Constitutional:       Appearance: He is normal weight. He is not ill-appearing, toxic-appearing or diaphoretic.   HENT:      Head: Normocephalic and atraumatic.      Right Ear: Tympanic membrane and external ear normal.      Left Ear: Tympanic membrane and external ear normal.      Nose: Nose normal. No rhinorrhea.   Eyes:      Extraocular Movements: Extraocular movements intact.      Conjunctiva/sclera: Conjunctivae normal.      Pupils: Pupils are equal, round, and reactive to light.   Neck:      Thyroid: No thyromegaly.   Cardiovascular:      Rate and Rhythm: Normal rate and regular rhythm.      Pulses: Normal pulses.      Heart sounds: No

## 2024-02-29 ENCOUNTER — OFFICE VISIT (OUTPATIENT)
Age: 54
End: 2024-02-29

## 2024-02-29 VITALS
DIASTOLIC BLOOD PRESSURE: 75 MMHG | HEIGHT: 70 IN | RESPIRATION RATE: 18 BRPM | SYSTOLIC BLOOD PRESSURE: 112 MMHG | OXYGEN SATURATION: 97 % | BODY MASS INDEX: 25.91 KG/M2 | HEART RATE: 77 BPM | TEMPERATURE: 98.4 F | WEIGHT: 181 LBS

## 2024-02-29 DIAGNOSIS — I10 PRIMARY HYPERTENSION: Primary | ICD-10-CM

## 2024-02-29 DIAGNOSIS — N52.1 ERECTILE DYSFUNCTION DUE TO DISEASES CLASSIFIED ELSEWHERE: ICD-10-CM

## 2024-02-29 DIAGNOSIS — E11.9 TYPE 2 DIABETES MELLITUS WITHOUT COMPLICATION, WITHOUT LONG-TERM CURRENT USE OF INSULIN (HCC): ICD-10-CM

## 2024-02-29 DIAGNOSIS — Z76.0 ENCOUNTER FOR MEDICATION REFILL: ICD-10-CM

## 2024-02-29 DIAGNOSIS — N52.9 ERECTILE DYSFUNCTION, UNSPECIFIED ERECTILE DYSFUNCTION TYPE: ICD-10-CM

## 2024-02-29 DIAGNOSIS — E78.00 HIGH CHOLESTEROL: ICD-10-CM

## 2024-02-29 RX ORDER — LISINOPRIL 2.5 MG/1
2.5 TABLET ORAL DAILY
Qty: 30 TABLET | Refills: 0 | Status: SHIPPED | OUTPATIENT
Start: 2024-02-29 | End: 2024-03-30

## 2024-02-29 RX ORDER — ATORVASTATIN CALCIUM 20 MG/1
20 TABLET, FILM COATED ORAL DAILY
Qty: 30 TABLET | Refills: 0 | Status: SHIPPED | OUTPATIENT
Start: 2024-02-29 | End: 2024-03-30

## 2024-02-29 RX ORDER — SILDENAFIL 50 MG/1
50 TABLET, FILM COATED ORAL DAILY PRN
Qty: 30 TABLET | Refills: 0 | Status: SHIPPED | OUTPATIENT
Start: 2024-02-29 | End: 2024-03-30

## 2024-02-29 RX ORDER — FENOFIBRATE 145 MG/1
145 TABLET, COATED ORAL DAILY
Qty: 30 TABLET | Refills: 0 | Status: SHIPPED | OUTPATIENT
Start: 2024-02-29 | End: 2024-03-30

## 2024-02-29 NOTE — PROGRESS NOTES
Ruslan Jones (:  1970) is a 53 y.o. male, here for evaluation of the following chief complaint(s):  Blood sugar (Check diabetes and medication.)      ASSESSMENT/PLAN:  Visit Diagnoses and Associated Orders       Primary hypertension    -  Primary         Erectile dysfunction, unspecified erectile dysfunction type             Type 2 diabetes mellitus without complication, without long-term current use of insulin (HCC)             High cholesterol             Erectile dysfunction due to diseases classified elsewhere             Encounter for medication refill             ORDERS WITHOUT AN ASSOCIATED DIAGNOSIS    sildenafil (VIAGRA) 50 MG tablet [[id number]]      metFORMIN (GLUCOPHAGE) 500 MG tablet [[id number]]      lisinopril (PRINIVIL;ZESTRIL) 2.5 MG tablet [[id number]]      fenofibrate (TRICOR) 145 MG tablet [[id number]]      atorvastatin (LIPITOR) 20 MG tablet [[id number]]             Summary    - Patient exam and subjective history support the diagnosis for today's visit.     Differentials:     SUBJECTIVE/OBJECTIVE:  JAMES Mercer presents to the clinic for medication refills. He is recovering from     Vitals:    24 1611   BP: 112/75   Pulse: 77   Resp: 18   Temp: 98.4 °F (36.9 °C)   SpO2: 97%   Weight: 82.1 kg (181 lb)   Height: 1.778 m (5' 10\")       No results found for this visit on 24.     No orders to display       Review of Systems      Physical Exam  Vitals reviewed.   Constitutional:       Appearance: He is normal weight.   HENT:      Head: Normocephalic.      Mouth/Throat:      Mouth: Mucous membranes are moist.   Eyes:      Pupils: Pupils are equal, round, and reactive to light.   Pulmonary:      Effort: Pulmonary effort is normal.   Abdominal:      Tenderness: There is no guarding.   Musculoskeletal:      Cervical back: Neck supple.   Skin:     General: Skin is warm.      Comments: Surgical scar across right anterior hip   Neurological:      Mental Status: He is alert and oriented to person, place, and

## 2024-04-26 ENCOUNTER — OFFICE VISIT (OUTPATIENT)
Age: 54
End: 2024-04-26

## 2024-04-26 VITALS
TEMPERATURE: 98.5 F | OXYGEN SATURATION: 96 % | HEART RATE: 70 BPM | DIASTOLIC BLOOD PRESSURE: 76 MMHG | RESPIRATION RATE: 16 BRPM | BODY MASS INDEX: 26.63 KG/M2 | SYSTOLIC BLOOD PRESSURE: 120 MMHG | HEIGHT: 70 IN | WEIGHT: 186 LBS

## 2024-04-26 DIAGNOSIS — E11.9 TYPE 2 DIABETES MELLITUS WITHOUT COMPLICATION, WITHOUT LONG-TERM CURRENT USE OF INSULIN (HCC): ICD-10-CM

## 2024-04-26 DIAGNOSIS — E78.00 HIGH CHOLESTEROL: ICD-10-CM

## 2024-04-26 DIAGNOSIS — N52.1 ERECTILE DYSFUNCTION DUE TO DISEASES CLASSIFIED ELSEWHERE: ICD-10-CM

## 2024-04-26 DIAGNOSIS — N52.9 ERECTILE DYSFUNCTION, UNSPECIFIED ERECTILE DYSFUNCTION TYPE: ICD-10-CM

## 2024-04-26 DIAGNOSIS — I10 PRIMARY HYPERTENSION: Primary | ICD-10-CM

## 2024-04-26 DIAGNOSIS — Z76.0 ENCOUNTER FOR MEDICATION REFILL: ICD-10-CM

## 2024-04-26 DIAGNOSIS — I10 HYPERTENSION, UNSPECIFIED TYPE: ICD-10-CM

## 2024-04-26 DIAGNOSIS — Z01.818 PRE-OPERATIVE EXAM: ICD-10-CM

## 2024-04-26 RX ORDER — LISINOPRIL 2.5 MG/1
2.5 TABLET ORAL DAILY
Qty: 30 TABLET | Refills: 0 | Status: SHIPPED | OUTPATIENT
Start: 2024-04-26 | End: 2024-05-26

## 2024-04-26 RX ORDER — SILDENAFIL 50 MG/1
50 TABLET, FILM COATED ORAL DAILY PRN
Qty: 30 TABLET | Refills: 0 | Status: SHIPPED | OUTPATIENT
Start: 2024-04-26 | End: 2024-05-26

## 2024-04-26 RX ORDER — FENOFIBRATE 145 MG/1
145 TABLET, COATED ORAL DAILY
Qty: 30 TABLET | Refills: 0 | Status: SHIPPED | OUTPATIENT
Start: 2024-04-26 | End: 2024-05-26

## 2024-04-26 RX ORDER — ATORVASTATIN CALCIUM 20 MG/1
20 TABLET, FILM COATED ORAL DAILY
Qty: 30 TABLET | Refills: 0 | Status: SHIPPED | OUTPATIENT
Start: 2024-04-26 | End: 2024-05-26

## 2024-04-26 NOTE — PATIENT INSTRUCTIONS
Ruslan,    It was an absolute pleasure taking care of you today.  I'm hoping you'll start feeling better as soon as possible. Please call me if you have any questions or concerns about what we talked about today.  Feel free stop back in if anything changes or things seem to be getting worse.  If for some reason you develop any serious or potentially life-threatening issues, call 911 or proceed to the nearest emergency department.  Hopefully it will never come to that.  Otherwise, it was very nice to meet you Ruslan.      Thanks,     Umair Small

## 2024-04-26 NOTE — PROGRESS NOTES
Ruslan Jones (:  1970) is a 53 y.o. male, here for evaluation of the following chief complaint(s):  Medication Refill (Pt is here for diabetes medication refill)      ASSESSMENT/PLAN:  Visit Diagnoses and Associated Orders       Primary hypertension    -  Primary         Erectile dysfunction, unspecified erectile dysfunction type             Type 2 diabetes mellitus without complication, without long-term current use of insulin (HCC)             High cholesterol             Erectile dysfunction due to diseases classified elsewhere             Encounter for medication refill             Pre-operative exam             Hypertension, unspecified type             ORDERS WITHOUT AN ASSOCIATED DIAGNOSIS    atorvastatin (LIPITOR) 20 MG tablet [[id number]]      fenofibrate (TRICOR) 145 MG tablet [[id number]]      lisinopril (PRINIVIL;ZESTRIL) 2.5 MG tablet [[id number]]      metFORMIN (GLUCOPHAGE) 500 MG tablet [[id number]]      sildenafil (VIAGRA) 50 MG tablet [[id number]]             Summary    - Patient exam and subjective history support the diagnosis for today's visit.   - Advised him the need to get established with a PCP.    SUBJECTIVE/OBJECTIVE:  JAMES Mercer presents to the clinic for medication refills. He denies side effects to these medication.  He is still recovering from right hip replacement but just recently stopped physical therapy.   He tells me that he is going to make an appointment with a PCP today.  He plans to call them when     Vitals:    24 1119   BP: 120/76   Pulse: 70   Resp: 16   Temp: 98.5 °F (36.9 °C)   SpO2: 96%   Weight: 84.4 kg (186 lb)   Height: 1.778 m (5' 10\")     No results found for this visit on 24.     No orders to display     Review of Systems    Physical Exam  Vitals reviewed.   Constitutional:       Appearance: He is normal weight.   HENT:      Head: Normocephalic.      Mouth/Throat:      Mouth: Mucous membranes are moist.   Eyes:      Pupils: Pupils are equal, round, and reactive to

## 2024-05-31 ENCOUNTER — OFFICE VISIT (OUTPATIENT)
Age: 54
End: 2024-05-31

## 2024-05-31 VITALS
WEIGHT: 187 LBS | TEMPERATURE: 98.7 F | BODY MASS INDEX: 26.83 KG/M2 | RESPIRATION RATE: 16 BRPM | DIASTOLIC BLOOD PRESSURE: 86 MMHG | HEART RATE: 78 BPM | OXYGEN SATURATION: 98 % | SYSTOLIC BLOOD PRESSURE: 140 MMHG

## 2024-05-31 DIAGNOSIS — Z76.0 ENCOUNTER FOR MEDICATION REFILL: Primary | ICD-10-CM

## 2024-05-31 RX ORDER — ATORVASTATIN CALCIUM 20 MG/1
20 TABLET, FILM COATED ORAL DAILY
Qty: 90 TABLET | Refills: 0 | Status: SHIPPED | OUTPATIENT
Start: 2024-05-31

## 2024-05-31 RX ORDER — LISINOPRIL 5 MG/1
2.5 TABLET ORAL DAILY
Qty: 90 TABLET | Refills: 1 | Status: SHIPPED | OUTPATIENT
Start: 2024-05-31

## 2024-05-31 RX ORDER — FENOFIBRATE 145 MG/1
145 TABLET, COATED ORAL DAILY
Qty: 30 TABLET | Refills: 3 | Status: SHIPPED | OUTPATIENT
Start: 2024-05-31

## 2024-05-31 RX ORDER — SILDENAFIL CITRATE 20 MG/1
20 TABLET ORAL DAILY PRN
Qty: 10 TABLET | Refills: 0 | Status: SHIPPED | OUTPATIENT
Start: 2024-05-31

## 2024-05-31 ASSESSMENT — ENCOUNTER SYMPTOMS
ABDOMINAL PAIN: 0
SHORTNESS OF BREATH: 0

## 2024-05-31 NOTE — PROGRESS NOTES
Ruslan Jones (:  1970) is a 53 y.o. male,Established patient, here for evaluation of the following chief complaint(s):  Medication Refill      Assessment & Plan :  Visit Diagnoses and Associated Orders       Encounter for medication refill    -  Primary    atorvastatin (LIPITOR) 20 MG tablet [38747]      fenofibrate (TRICOR) 145 MG tablet [[id number]]      lisinopril (PRINIVIL;ZESTRIL) 5 MG tablet [[id number]]      metFORMIN (GLUCOPHAGE) 500 MG tablet [[id number]]      sildenafil (REVATIO) 20 MG tablet [[id number]]                   Plan: Patient given refills of his current medications. He is supposed to have an appointment at the end of  with a new primary care. I advised him to keep that appointment as planned. Return precautions discussed. Follow up in 3 days if symptoms persist or if symptoms worsen.       Subjective :  HPI  Ruslan Jones is a 53 y.o. male who presents for medication refill. He states that he needs refills of all of his prescribed medications. He states that he works for the [employer] and has irregular hours. He had an appointment with his primary care but had to cancel several appointments so they discharged him as a patient. He does have an new patient appointment with a new primary care at the end of . He denies other complaints.        Vitals:    24 1909   BP: (!) 140/86   Pulse: 78   Resp: 16   Temp: 98.7 °F (37.1 °C)   TempSrc: Oral   SpO2: 98%   Weight: 84.8 kg (187 lb)        Review of Systems   Constitutional:  Negative for chills, fatigue and fever.   Respiratory:  Negative for shortness of breath.    Cardiovascular:  Negative for chest pain.   Gastrointestinal:  Negative for abdominal pain.   Musculoskeletal:  Negative for myalgias.   Neurological:  Negative for syncope and headaches.         Objective   Physical Exam  Constitutional:       Appearance: Normal appearance. He is not ill-appearing.   HENT:      Head: Normocephalic.      Mouth/Throat:      Mouth: Mucous membranes

## 2024-05-31 NOTE — PATIENT INSTRUCTIONS
Medication refills sent to pharmacy.  Follow-up with PCP appointment as planned at the end of June.  Return for severe/worsening symptoms.

## 2024-06-06 ENCOUNTER — OFFICE VISIT (OUTPATIENT)
Age: 54
End: 2024-06-06

## 2024-06-06 VITALS
BODY MASS INDEX: 27.03 KG/M2 | DIASTOLIC BLOOD PRESSURE: 75 MMHG | HEART RATE: 70 BPM | TEMPERATURE: 98.7 F | HEIGHT: 70 IN | SYSTOLIC BLOOD PRESSURE: 122 MMHG | OXYGEN SATURATION: 96 % | WEIGHT: 188.8 LBS

## 2024-06-06 DIAGNOSIS — N52.9 ERECTILE DYSFUNCTION, UNSPECIFIED ERECTILE DYSFUNCTION TYPE: Primary | ICD-10-CM

## 2024-06-06 RX ORDER — SILDENAFIL 50 MG/1
50 TABLET, FILM COATED ORAL DAILY PRN
Qty: 30 TABLET | Refills: 0 | Status: SHIPPED | OUTPATIENT
Start: 2024-06-06 | End: 2024-07-06

## 2024-06-06 NOTE — PROGRESS NOTES
Ruslan Jones (:  1970) is a 53 y.o. male, here for evaluation of the following chief complaint(s):  Wound Check (Pt here for follow-up visit.)    ASSESSMENT/PLAN:  Visit Diagnoses and Associated Orders       Erectile dysfunction, unspecified erectile dysfunction type    -  Primary         ORDERS WITHOUT AN ASSOCIATED DIAGNOSIS    sildenafil (VIAGRA) 50 MG tablet [[id number]]               Summary    - Patient exam and subjective history support the diagnosis for today's visit.   - I had another discussion with him about how he needs to get in with a PCP.  We've had this conversation with every refill encounter we've had.      SUBJECTIVE/OBJECTIVE:    Wound Check        Ruslan presents to the clinic for refills for his generic viagra.  He was seen a few days ago for his usual refills but there was some confusion regarding his viagra dispense amount. He has an appointment with a PCP named Codie Jeronimo MD on 2024.     Vitals:    24 1140   BP: 122/75   Pulse: 70   Temp: 98.7 °F (37.1 °C)   TempSrc: Oral   SpO2: 96%   Weight: 85.6 kg (188 lb 12.8 oz)   Height: 1.778 m (5' 10\")     No results found for this visit on 24.     No orders to display     Review of Systems    Physical Exam  Vitals reviewed.   Constitutional:       Appearance: He is normal weight.   HENT:      Head: Normocephalic.      Mouth/Throat:      Mouth: Mucous membranes are moist.   Eyes:      Pupils: Pupils are equal, round, and reactive to light.   Cardiovascular:      Rate and Rhythm: Normal rate and regular rhythm.   Pulmonary:      Effort: Pulmonary effort is normal.   Abdominal:      Tenderness: There is no guarding.   Musculoskeletal:      Cervical back: Neck supple.   Skin:     General: Skin is warm.   Neurological:      Mental Status: He is alert and oriented to person, place, and time.   Psychiatric:         Mood and Affect: Mood normal.         Behavior: Behavior normal.       An electronic signature was used to

## 2024-06-22 RX ORDER — ATORVASTATIN CALCIUM 20 MG/1
20 TABLET, FILM COATED ORAL DAILY
Qty: 30 TABLET | Refills: 0 | OUTPATIENT
Start: 2024-06-22 | End: 2024-07-22

## 2024-08-21 ENCOUNTER — OFFICE VISIT (OUTPATIENT)
Dept: FAMILY MEDICINE CLINIC | Age: 54
End: 2024-08-21
Payer: COMMERCIAL

## 2024-08-21 VITALS
BODY MASS INDEX: 26.92 KG/M2 | SYSTOLIC BLOOD PRESSURE: 125 MMHG | HEART RATE: 77 BPM | OXYGEN SATURATION: 98 % | DIASTOLIC BLOOD PRESSURE: 76 MMHG | RESPIRATION RATE: 18 BRPM | HEIGHT: 70 IN | WEIGHT: 188 LBS

## 2024-08-21 DIAGNOSIS — Z12.11 SCREENING FOR COLON CANCER: ICD-10-CM

## 2024-08-21 DIAGNOSIS — Z11.59 NEED FOR HEPATITIS C SCREENING TEST: ICD-10-CM

## 2024-08-21 DIAGNOSIS — Z11.4 ENCOUNTER FOR SCREENING FOR HIV: ICD-10-CM

## 2024-08-21 DIAGNOSIS — Z13.220 SCREENING, LIPID: ICD-10-CM

## 2024-08-21 DIAGNOSIS — E11.9 TYPE 2 DIABETES MELLITUS WITHOUT COMPLICATION, WITHOUT LONG-TERM CURRENT USE OF INSULIN (HCC): Primary | ICD-10-CM

## 2024-08-21 DIAGNOSIS — E11.9 TYPE 2 DIABETES MELLITUS WITHOUT COMPLICATION, WITHOUT LONG-TERM CURRENT USE OF INSULIN (HCC): ICD-10-CM

## 2024-08-21 DIAGNOSIS — Z76.0 ENCOUNTER FOR MEDICATION REFILL: ICD-10-CM

## 2024-08-21 LAB
ALBUMIN SERPL-MCNC: 4.3 G/DL (ref 3.4–5)
ALBUMIN/GLOB SERPL: 2.3 {RATIO} (ref 1.1–2.2)
ALP SERPL-CCNC: 45 U/L (ref 40–129)
ALT SERPL-CCNC: 31 U/L (ref 10–40)
ANION GAP SERPL CALCULATED.3IONS-SCNC: 9 MMOL/L (ref 3–16)
AST SERPL-CCNC: 31 U/L (ref 15–37)
BILIRUB SERPL-MCNC: 0.5 MG/DL (ref 0–1)
BUN SERPL-MCNC: 8 MG/DL (ref 7–20)
CALCIUM SERPL-MCNC: 9.2 MG/DL (ref 8.3–10.6)
CHLORIDE SERPL-SCNC: 103 MMOL/L (ref 99–110)
CHOLEST SERPL-MCNC: 148 MG/DL (ref 0–199)
CO2 SERPL-SCNC: 26 MMOL/L (ref 21–32)
CREAT SERPL-MCNC: 1.1 MG/DL (ref 0.9–1.3)
GFR SERPLBLD CREATININE-BSD FMLA CKD-EPI: 79 ML/MIN/{1.73_M2}
GLUCOSE SERPL-MCNC: 120 MG/DL (ref 70–99)
HDLC SERPL-MCNC: 51 MG/DL (ref 40–60)
LDLC SERPL CALC-MCNC: 89 MG/DL
POTASSIUM SERPL-SCNC: 4.6 MMOL/L (ref 3.5–5.1)
PROT SERPL-MCNC: 6.2 G/DL (ref 6.4–8.2)
SODIUM SERPL-SCNC: 138 MMOL/L (ref 136–145)
TRIGL SERPL-MCNC: 38 MG/DL (ref 0–150)
VLDLC SERPL CALC-MCNC: 8 MG/DL

## 2024-08-21 PROCEDURE — 2022F DILAT RTA XM EVC RTNOPTHY: CPT | Performed by: FAMILY MEDICINE

## 2024-08-21 PROCEDURE — 90677 PCV20 VACCINE IM: CPT | Performed by: FAMILY MEDICINE

## 2024-08-21 PROCEDURE — 99203 OFFICE O/P NEW LOW 30 MIN: CPT | Performed by: FAMILY MEDICINE

## 2024-08-21 PROCEDURE — 1036F TOBACCO NON-USER: CPT | Performed by: FAMILY MEDICINE

## 2024-08-21 PROCEDURE — 3046F HEMOGLOBIN A1C LEVEL >9.0%: CPT | Performed by: FAMILY MEDICINE

## 2024-08-21 PROCEDURE — 3017F COLORECTAL CA SCREEN DOC REV: CPT | Performed by: FAMILY MEDICINE

## 2024-08-21 PROCEDURE — 90471 IMMUNIZATION ADMIN: CPT | Performed by: FAMILY MEDICINE

## 2024-08-21 PROCEDURE — G8419 CALC BMI OUT NRM PARAM NOF/U: HCPCS | Performed by: FAMILY MEDICINE

## 2024-08-21 PROCEDURE — G8427 DOCREV CUR MEDS BY ELIG CLIN: HCPCS | Performed by: FAMILY MEDICINE

## 2024-08-21 RX ORDER — LISINOPRIL 5 MG/1
2.5 TABLET ORAL DAILY
Qty: 90 TABLET | Refills: 1 | Status: SHIPPED | OUTPATIENT
Start: 2024-08-21 | End: 2024-08-21

## 2024-08-21 RX ORDER — SILDENAFIL 50 MG/1
50 TABLET, FILM COATED ORAL DAILY PRN
Qty: 30 TABLET | Refills: 0 | Status: SHIPPED | OUTPATIENT
Start: 2024-08-21 | End: 2024-08-21

## 2024-08-21 RX ORDER — FENOFIBRATE 145 MG/1
145 TABLET, COATED ORAL DAILY
Qty: 30 TABLET | Refills: 3 | Status: SHIPPED | OUTPATIENT
Start: 2024-08-21 | End: 2024-08-21

## 2024-08-21 RX ORDER — SILDENAFIL 50 MG/1
50 TABLET, FILM COATED ORAL DAILY PRN
Qty: 30 TABLET | Refills: 5 | Status: SHIPPED | OUTPATIENT
Start: 2024-08-21 | End: 2025-02-17

## 2024-08-21 RX ORDER — LISINOPRIL 2.5 MG/1
2.5 TABLET ORAL DAILY
Qty: 90 TABLET | Refills: 1 | Status: SHIPPED | OUTPATIENT
Start: 2024-08-21 | End: 2025-02-17

## 2024-08-21 RX ORDER — FENOFIBRATE 145 MG/1
145 TABLET, COATED ORAL DAILY
Qty: 90 TABLET | Refills: 1 | Status: SHIPPED | OUTPATIENT
Start: 2024-08-21 | End: 2025-02-17

## 2024-08-21 RX ORDER — ATORVASTATIN CALCIUM 20 MG/1
20 TABLET, FILM COATED ORAL DAILY
Qty: 30 TABLET | Refills: 0 | Status: SHIPPED | OUTPATIENT
Start: 2024-08-21 | End: 2024-08-21

## 2024-08-21 RX ORDER — ATORVASTATIN CALCIUM 20 MG/1
20 TABLET, FILM COATED ORAL DAILY
Qty: 90 TABLET | Refills: 1 | Status: SHIPPED | OUTPATIENT
Start: 2024-08-21 | End: 2025-02-17

## 2024-08-21 SDOH — ECONOMIC STABILITY: FOOD INSECURITY: WITHIN THE PAST 12 MONTHS, THE FOOD YOU BOUGHT JUST DIDN'T LAST AND YOU DIDN'T HAVE MONEY TO GET MORE.: NEVER TRUE

## 2024-08-21 SDOH — ECONOMIC STABILITY: FOOD INSECURITY: WITHIN THE PAST 12 MONTHS, YOU WORRIED THAT YOUR FOOD WOULD RUN OUT BEFORE YOU GOT MONEY TO BUY MORE.: NEVER TRUE

## 2024-08-21 SDOH — ECONOMIC STABILITY: INCOME INSECURITY: HOW HARD IS IT FOR YOU TO PAY FOR THE VERY BASICS LIKE FOOD, HOUSING, MEDICAL CARE, AND HEATING?: NOT HARD AT ALL

## 2024-08-21 ASSESSMENT — ENCOUNTER SYMPTOMS
RHINORRHEA: 0
ABDOMINAL PAIN: 0
BACK PAIN: 0
VOMITING: 0
SHORTNESS OF BREATH: 0
SORE THROAT: 0
BLOOD IN STOOL: 0
DIARRHEA: 0
WHEEZING: 0
NAUSEA: 0
CONSTIPATION: 0
COUGH: 0

## 2024-08-21 ASSESSMENT — PATIENT HEALTH QUESTIONNAIRE - PHQ9
SUM OF ALL RESPONSES TO PHQ QUESTIONS 1-9: 0
SUM OF ALL RESPONSES TO PHQ9 QUESTIONS 1 & 2: 0
SUM OF ALL RESPONSES TO PHQ QUESTIONS 1-9: 0
2. FEELING DOWN, DEPRESSED OR HOPELESS: NOT AT ALL
SUM OF ALL RESPONSES TO PHQ QUESTIONS 1-9: 0
1. LITTLE INTEREST OR PLEASURE IN DOING THINGS: NOT AT ALL
SUM OF ALL RESPONSES TO PHQ QUESTIONS 1-9: 0

## 2024-08-21 NOTE — PROGRESS NOTES
Mental Status: He is alert and oriented to person, place, and time. Mental status is at baseline.   Psychiatric:         Mood and Affect: Mood normal.         Behavior: Behavior normal.         Thought Content: Thought content normal.         Judgment: Judgment normal.                  An electronic signature was used to authenticate this note.    --Franklin Nava, DO

## 2024-08-21 NOTE — ASSESSMENT & PLAN NOTE
Unclear control, continue current plan pending work up below referral to ophthalmology for diabetic eye exam, had evaluation today and microalbumin creatinine ratio.  Plan for diabetic foot exam in the future.    Orders:    Comprehensive Metabolic Panel; Future    Hemoglobin A1C; Future    Microalbumin / Creatinine Urine Ratio    Jorge Renee MD, (Vitreoretinal Diseases [facility] Surgery) Ophthalmology, [facility]

## 2024-08-22 ENCOUNTER — TELEPHONE (OUTPATIENT)
Dept: FAMILY MEDICINE CLINIC | Age: 54
End: 2024-08-22

## 2024-08-22 LAB
CREAT UR-MCNC: 79.4 MG/DL (ref 39–259)
EST. AVERAGE GLUCOSE BLD GHB EST-MCNC: 145.6 MG/DL
HBA1C MFR BLD: 6.7 %
HIV 1+2 AB+HIV1 P24 AG SERPL QL IA: NORMAL
HIV 2 AB SERPL QL IA: NORMAL
HIV1 AB SERPL QL IA: NORMAL
HIV1 P24 AG SERPL QL IA: NORMAL
MICROALBUMIN UR DL<=1MG/L-MCNC: <1.2 MG/DL
MICROALBUMIN/CREAT UR: NORMAL MG/G (ref 0–30)

## 2024-08-22 NOTE — TELEPHONE ENCOUNTER
Called and left voicemail with patient in regards to lab results.  No concerns on lab results at this time we will continue with current medication regimen.    Franklin Nava, DO

## 2024-08-24 LAB
HCV RNA SERPL NAA+PROBE-ACNC: NOT DETECTED K[IU]/ML
HCV RNA SERPL NAA+PROBE-LOG IU: NOT DETECTED {LOG_IU}/ML
HCV RNA SERPL QL NAA+PROBE: NOT DETECTED

## 2025-07-14 NOTE — TELEPHONE ENCOUNTER
Dr Roberta Zamarripa, Please see previous message and advise.   Thank you Pt concerned of not being scheduled yet for [place]. Order was placed 7/10/25    Attempted to contact pt to give number to scheduling line. Scheduling has up to 3 business days to contact pt to schedule.     Unable to leave  @ [phone]    Number to scheduling [phone] opt [phone].

## (undated) DEVICE — 3M™ COBAN™ NL STERILE NON-LATEX SELF-ADHERENT WRAP, 2084S, 4 IN X 5 YD (10 CM X 4,5 M), 18 ROLLS/CASE: Brand: 3M™ COBAN™

## (undated) DEVICE — Device

## (undated) DEVICE — BANDAGE ELASTIC 5YDX4IN ST COMPRSS LF NON ADH

## (undated) DEVICE — NEEDLE SPNL L3.5IN PNK HUB S STL REG WALL FIT STYL W/ QNCKE

## (undated) DEVICE — SUTURE MCRYL SZ 4-0 L18IN ABSRB UD L19MM PS-2 3/8 CIR PRIM Y496G

## (undated) DEVICE — DRAPE,U/SHT,SPLIT,FILM,60X84,STERILE: Brand: MEDLINE

## (undated) DEVICE — SPONGE GZ W4XL4IN COT 12 PLY TYP VII WVN C FLD DSGN

## (undated) DEVICE — SHEET,DRAPE,53X77,STERILE: Brand: MEDLINE

## (undated) DEVICE — ELECTRODE PT RET AD L9FT HI MOIST COND ADH HYDRGEL CORDED

## (undated) DEVICE — GOWN SIRUS NONREIN XL W/TWL: Brand: MEDLINE INDUSTRIES, INC.

## (undated) DEVICE — COVER LT HNDL BLU PLAS

## (undated) DEVICE — SUTURE VCRL SZ 3-0 L27IN ABSRB UD L26MM SH 1/2 CIR J416H

## (undated) DEVICE — DRAPE HND W114XL142IN BLU POLYPR W O PCH FEN CRD AND TB HLDR

## (undated) DEVICE — CHLORAPREP 26ML ORANGE

## (undated) DEVICE — PADDING UNDERCAST W4INXL4YD 100% COT CRIMPED FINISH WBRL II

## (undated) DEVICE — GLOVE SURG SZ 8 L12IN FNGR THK87MIL WHT LTX FREE

## (undated) DEVICE — INTENT TO BE USED WITH SUTURE MATERIAL FOR TISSUE CLOSURE: Brand: RICHARD-ALLAN® NEEDLE 1/2 CIRCLE TAPER

## (undated) DEVICE — SHOULDER CANNULA SET WITHOUT FENESTRATIONS, 5.5 MM (I.D.) X 70 MM: Brand: CONMED

## (undated) DEVICE — KIT OR ROOM TURNOVER W/STRAP

## (undated) DEVICE — SLING TRAC LAT DISP FOR DECUB SHLDR SUSP SYS

## (undated) DEVICE — SUTURE PROL SZ 0 L30IN NONABSORBABLE BLU MO-6 L26MM 1/2 CIR 8418H

## (undated) DEVICE — SYRINGE MED 10ML LUERLOCK TIP W/O SFTY DISP

## (undated) DEVICE — PACK,SHOULDER,DRAPE,POUCH: Brand: MEDLINE

## (undated) DEVICE — SOLUTION IV IRRIG POUR BRL 0.9% SODIUM CHL 2F7124

## (undated) DEVICE — STOCKINETTE IMPERV M 9X44IN POLY INNR LAYR COT ORTH EXT

## (undated) DEVICE — NEEDLE HYPO 23GA L1.5IN TURQ POLYPR HUB S STL THN WALL IM

## (undated) DEVICE — GLOVE ORANGE PI 8   MSG9080

## (undated) DEVICE — INTENDED FOR TISSUE SEPARATION, AND OTHER PROCEDURES THAT REQUIRE A SHARP SURGICAL BLADE TO PUNCTURE OR CUT.: Brand: BARD-PARKER ® STAINLESS STEEL BLADES

## (undated) DEVICE — MAJOR SET UP PK

## (undated) DEVICE — 1010 S-DRAPE TOWEL DRAPE 10/BX: Brand: STERI-DRAPE™

## (undated) DEVICE — 3M™ TEGADERM™ TRANSPARENT FILM DRESSING FRAME STYLE, 1626W, 4 IN X 4-3/4 IN (10 CM X 12 CM), 50/CT 4CT/CASE: Brand: 3M™ TEGADERM™

## (undated) DEVICE — STOCKINETTE,IMPERVIOUS,12X48,STERILE: Brand: MEDLINE

## (undated) DEVICE — TOWEL,OR,DSP,ST,BLUE,STD,4/PK,20PK/CS: Brand: MEDLINE

## (undated) DEVICE — TUBING, SUCTION, 1/4" X 12', STRAIGHT: Brand: MEDLINE

## (undated) DEVICE — Z DISCONTINUED PER MEDLINE (LOW STOCK)  USE 2422770 DRAPE C ARM W54XL78IN FOR FLROSCN

## (undated) DEVICE — Z INACTIVE USE 2660664 SOLUTION IRRIG 3000ML 0.9% SOD CHL USP UROMATIC PLAS CONT

## (undated) DEVICE — 3M™ STERI-DRAPE™ U-DRAPE 1015: Brand: STERI-DRAPE™

## (undated) DEVICE — DRAPE,U/ SHT,SPLIT,PLAS,STERIL: Brand: MEDLINE

## (undated) DEVICE — ELECTRODE ES 90DEG SUCT INTEGR HNDPC DISP COOLPULSE 90 VAPR

## (undated) DEVICE — GLOVE ORANGE PI 7 1/2   MSG9075

## (undated) DEVICE — COVER US PRB W13XL244CM SURGICAL INTRAOPERATIVE W RUBBERBAND

## (undated) DEVICE — 3M™ COBAN™ NL STERILE NON-LATEX SELF-ADHERENT WRAP, 2086S, 6 IN X 5 YD (15 CM X 4,5 M), 12 ROLLS/CASE: Brand: 3M™ COBAN™

## (undated) DEVICE — KIT INSTR W/ 2.4MM GUIDEPIN SUT PASS WIRE NO2 FIBERWIRE

## (undated) DEVICE — GARMENT COMPR L FOR 23IN CALF FLOTRN

## (undated) DEVICE — STRIP,CLOSURE,WOUND,MEDI-STRIP,1/2X4: Brand: MEDLINE

## (undated) DEVICE — SUTURE PROL SZ 3-0 L18IN NONABSORBABLE BLU L30MM FS-1 3/8 8663G

## (undated) DEVICE — CANISTER, RIGID, 1200CC: Brand: MEDLINE INDUSTRIES, INC.

## (undated) DEVICE — [STANDARD 12-FLUTE BARREL BUR, ARTHROSCOPIC SHAVER BLADE,  DO NOT RESTERILIZE,  DO NOT USE IF PACKAGE IS DAMAGED,  KEEP DRY,  KEEP AWAY FROM SUNLIGHT]: Brand: FORMULA

## (undated) DEVICE — PAD DRY FLOOR ABS 32X58IN GRN

## (undated) DEVICE — 4-PORT MANIFOLD: Brand: NEPTUNE 2

## (undated) DEVICE — BANDAGE COBAN 4 IN COMPR W4INXL5YD FOAM COHESIVE QUIK STK SELF ADH SFT

## (undated) DEVICE — [ARTHROSCOPY PUMP,  DO NOT USE IF PACKAGE IS DAMAGED,  KEEP DRY,  KEEP AWAY FROM SUNLIGHT,  PROTECT FROM HEAT AND RADIOACTIVE SOURCES.]: Brand: FLOSTEADY

## (undated) DEVICE — SWAB CULT DBL W/O CHAR RAYON TIP AMIES GEL CLMN FOR COLL

## (undated) DEVICE — BANDAGE COMPR W4INXL12FT E DISP ESMARCH EVEN

## (undated) DEVICE — GLOVE SURG SZ 75 L12IN FNGR THK87MIL WHT LTX FREE

## (undated) DEVICE — [AGGRESSIVE PLUS CUTTER, ARTHROSCOPIC SHAVER BLADE,  DO NOT RESTERILIZE,  DO NOT USE IF PACKAGE IS DAMAGED,  KEEP DRY,  KEEP AWAY FROM SUNLIGHT]: Brand: FORMULA

## (undated) DEVICE — 3M™ IOBAN™ 2 ANTIMICROBIAL INCISE DRAPE 6650EZ: Brand: IOBAN™ 2